# Patient Record
Sex: MALE | Race: WHITE | NOT HISPANIC OR LATINO | Employment: FULL TIME | URBAN - METROPOLITAN AREA
[De-identification: names, ages, dates, MRNs, and addresses within clinical notes are randomized per-mention and may not be internally consistent; named-entity substitution may affect disease eponyms.]

---

## 2018-05-21 ENCOUNTER — TRANSFERRED RECORDS (OUTPATIENT)
Dept: HEALTH INFORMATION MANAGEMENT | Facility: CLINIC | Age: 63
End: 2018-05-21

## 2019-03-10 ENCOUNTER — TRANSFERRED RECORDS (OUTPATIENT)
Dept: HEALTH INFORMATION MANAGEMENT | Facility: CLINIC | Age: 64
End: 2019-03-10

## 2019-03-11 LAB
EJECTION FRACTION: NORMAL %
INR PPP: 1.1

## 2019-03-12 LAB
CHOLEST SERPL-MCNC: 143 MG/DL (ref 100–199)
HDLC SERPL-MCNC: 44 MG/DL
LDLC SERPL CALC-MCNC: 88 MG/DL
NONHDLC SERPL-MCNC: 99 MG/DL
TRIGL SERPL-MCNC: 55 MG/DL

## 2019-03-13 LAB
ALT SERPL-CCNC: 32 IU/L (ref 8–45)
AST SERPL-CCNC: 48 IU/L (ref 2–40)
CREAT SERPL-MCNC: 1.29 MG/DL (ref 0.72–1.25)
EJECTION FRACTION: 63 %
GFR SERPL CREATININE-BSD FRML MDRD: 56 ML/MIN/1.73M2
GLUCOSE SERPL-MCNC: 91 MG/DL (ref 65–100)
POTASSIUM SERPL-SCNC: 4.4 MMOL/L (ref 3.5–5)

## 2019-03-14 LAB
CREAT SERPL-MCNC: 1.29 MG/DL (ref 0.72–1.25)
GFR SERPL CREATININE-BSD FRML MDRD: 56 ML/MIN/1.73M2

## 2019-04-16 ENCOUNTER — TRANSFERRED RECORDS (OUTPATIENT)
Dept: HEALTH INFORMATION MANAGEMENT | Facility: CLINIC | Age: 64
End: 2019-04-16

## 2019-05-30 ENCOUNTER — TRANSFERRED RECORDS (OUTPATIENT)
Dept: HEALTH INFORMATION MANAGEMENT | Facility: CLINIC | Age: 64
End: 2019-05-30

## 2019-06-07 ENCOUNTER — TRANSFERRED RECORDS (OUTPATIENT)
Dept: HEALTH INFORMATION MANAGEMENT | Facility: CLINIC | Age: 64
End: 2019-06-07

## 2019-09-12 ENCOUNTER — TRANSFERRED RECORDS (OUTPATIENT)
Dept: HEALTH INFORMATION MANAGEMENT | Facility: CLINIC | Age: 64
End: 2019-09-12

## 2019-09-22 ENCOUNTER — TRANSFERRED RECORDS (OUTPATIENT)
Dept: HEALTH INFORMATION MANAGEMENT | Facility: CLINIC | Age: 64
End: 2019-09-22

## 2019-09-24 ENCOUNTER — TRANSFERRED RECORDS (OUTPATIENT)
Dept: HEALTH INFORMATION MANAGEMENT | Facility: CLINIC | Age: 64
End: 2019-09-24

## 2020-03-18 ENCOUNTER — TELEPHONE (OUTPATIENT)
Dept: FAMILY MEDICINE | Facility: CLINIC | Age: 65
End: 2020-03-18

## 2020-03-18 NOTE — TELEPHONE ENCOUNTER
Attempted to call patient. Busy signal. Will try again later.  Mercedes Jerez on 3/18/2020 at 10:02 AM

## 2020-03-24 ENCOUNTER — TRANSFERRED RECORDS (OUTPATIENT)
Dept: HEALTH INFORMATION MANAGEMENT | Facility: CLINIC | Age: 65
End: 2020-03-24

## 2020-06-17 ENCOUNTER — OFFICE VISIT (OUTPATIENT)
Dept: FAMILY MEDICINE | Facility: CLINIC | Age: 65
End: 2020-06-17
Payer: COMMERCIAL

## 2020-06-17 VITALS
WEIGHT: 153.13 LBS | BODY MASS INDEX: 24.03 KG/M2 | TEMPERATURE: 98.1 F | HEART RATE: 65 BPM | SYSTOLIC BLOOD PRESSURE: 152 MMHG | RESPIRATION RATE: 12 BRPM | OXYGEN SATURATION: 100 % | DIASTOLIC BLOOD PRESSURE: 80 MMHG | HEIGHT: 67 IN

## 2020-06-17 DIAGNOSIS — R31.0 GROSS HEMATURIA: Primary | ICD-10-CM

## 2020-06-17 DIAGNOSIS — I10 ESSENTIAL HYPERTENSION: ICD-10-CM

## 2020-06-17 DIAGNOSIS — I48.0 PAF (PAROXYSMAL ATRIAL FIBRILLATION) (H): ICD-10-CM

## 2020-06-17 DIAGNOSIS — R93.41 ABNORMAL CT SCAN, BLADDER: ICD-10-CM

## 2020-06-17 PROBLEM — Z86.79 S/P ABLATION OF ATRIAL FIBRILLATION: Status: ACTIVE | Noted: 2019-09-12

## 2020-06-17 PROBLEM — Z98.890 S/P ABLATION OF ATRIAL FIBRILLATION: Status: ACTIVE | Noted: 2019-09-12

## 2020-06-17 LAB
ALBUMIN SERPL-MCNC: 3.8 G/DL (ref 3.4–5)
ALBUMIN UR-MCNC: 30 MG/DL
ALP SERPL-CCNC: 104 U/L (ref 40–150)
ALT SERPL W P-5'-P-CCNC: 30 U/L (ref 0–70)
ANION GAP SERPL CALCULATED.3IONS-SCNC: 4 MMOL/L (ref 3–14)
APPEARANCE UR: ABNORMAL
AST SERPL W P-5'-P-CCNC: 20 U/L (ref 0–45)
BILIRUB SERPL-MCNC: 0.5 MG/DL (ref 0.2–1.3)
BILIRUB UR QL STRIP: NEGATIVE
BUN SERPL-MCNC: 24 MG/DL (ref 7–30)
CALCIUM SERPL-MCNC: 8.6 MG/DL (ref 8.5–10.1)
CHLORIDE SERPL-SCNC: 112 MMOL/L (ref 94–109)
CHOLEST SERPL-MCNC: 160 MG/DL
CO2 SERPL-SCNC: 26 MMOL/L (ref 20–32)
COLOR UR AUTO: YELLOW
CREAT SERPL-MCNC: 1.13 MG/DL (ref 0.66–1.25)
ERYTHROCYTE [DISTWIDTH] IN BLOOD BY AUTOMATED COUNT: 12 % (ref 10–15)
GFR SERPL CREATININE-BSD FRML MDRD: 67 ML/MIN/{1.73_M2}
GLUCOSE SERPL-MCNC: 98 MG/DL (ref 70–99)
GLUCOSE UR STRIP-MCNC: NEGATIVE MG/DL
HCT VFR BLD AUTO: 38.9 % (ref 40–53)
HDLC SERPL-MCNC: 48 MG/DL
HGB BLD-MCNC: 12.7 G/DL (ref 13.3–17.7)
HGB UR QL STRIP: ABNORMAL
KETONES UR STRIP-MCNC: NEGATIVE MG/DL
LDLC SERPL CALC-MCNC: 93 MG/DL
LEUKOCYTE ESTERASE UR QL STRIP: ABNORMAL
MCH RBC QN AUTO: 30.8 PG (ref 26.5–33)
MCHC RBC AUTO-ENTMCNC: 32.6 G/DL (ref 31.5–36.5)
MCV RBC AUTO: 94 FL (ref 78–100)
MUCOUS THREADS #/AREA URNS LPF: PRESENT /LPF
NITRATE UR QL: NEGATIVE
NONHDLC SERPL-MCNC: 112 MG/DL
PH UR STRIP: 5 PH (ref 5–7)
PLATELET # BLD AUTO: 204 10E9/L (ref 150–450)
POTASSIUM SERPL-SCNC: 4.5 MMOL/L (ref 3.4–5.3)
PROT SERPL-MCNC: 7.4 G/DL (ref 6.8–8.8)
RBC # BLD AUTO: 4.12 10E12/L (ref 4.4–5.9)
RBC #/AREA URNS AUTO: >182 /HPF (ref 0–2)
SODIUM SERPL-SCNC: 142 MMOL/L (ref 133–144)
SOURCE: ABNORMAL
SP GR UR STRIP: 1.01 (ref 1–1.03)
SQUAMOUS #/AREA URNS AUTO: <1 /HPF (ref 0–1)
TRIGL SERPL-MCNC: 97 MG/DL
UROBILINOGEN UR STRIP-MCNC: 0 MG/DL (ref 0–2)
WBC # BLD AUTO: 5.5 10E9/L (ref 4–11)
WBC #/AREA URNS AUTO: 28 /HPF (ref 0–5)

## 2020-06-17 PROCEDURE — 81001 URINALYSIS AUTO W/SCOPE: CPT | Performed by: FAMILY MEDICINE

## 2020-06-17 PROCEDURE — 36415 COLL VENOUS BLD VENIPUNCTURE: CPT | Performed by: FAMILY MEDICINE

## 2020-06-17 PROCEDURE — 87086 URINE CULTURE/COLONY COUNT: CPT | Performed by: FAMILY MEDICINE

## 2020-06-17 PROCEDURE — 80061 LIPID PANEL: CPT | Performed by: FAMILY MEDICINE

## 2020-06-17 PROCEDURE — 85027 COMPLETE CBC AUTOMATED: CPT | Performed by: FAMILY MEDICINE

## 2020-06-17 PROCEDURE — 99204 OFFICE O/P NEW MOD 45 MIN: CPT | Performed by: FAMILY MEDICINE

## 2020-06-17 PROCEDURE — 80053 COMPREHEN METABOLIC PANEL: CPT | Performed by: FAMILY MEDICINE

## 2020-06-17 RX ORDER — LISINOPRIL 5 MG/1
5 TABLET ORAL DAILY
Qty: 30 TABLET | Refills: 1 | Status: SHIPPED | OUTPATIENT
Start: 2020-06-17 | End: 2020-06-17

## 2020-06-17 RX ORDER — ROPINIROLE 0.5 MG/1
TABLET, FILM COATED ORAL
COMMUNITY
Start: 2020-04-27 | End: 2023-11-01

## 2020-06-17 RX ORDER — AMLODIPINE BESYLATE 2.5 MG/1
2.5 TABLET ORAL DAILY
Qty: 30 TABLET | Refills: 0 | Status: ON HOLD | OUTPATIENT
Start: 2020-06-17 | End: 2020-07-29

## 2020-06-17 SDOH — HEALTH STABILITY: MENTAL HEALTH: HOW OFTEN DO YOU HAVE A DRINK CONTAINING ALCOHOL?: NEVER

## 2020-06-17 ASSESSMENT — PAIN SCALES - GENERAL: PAINLEVEL: NO PAIN (0)

## 2020-06-17 ASSESSMENT — MIFFLIN-ST. JEOR: SCORE: 1433.2

## 2020-06-17 NOTE — PROGRESS NOTES
Subjective     Marilou Saldivar is a 65 year old male who presents to clinic today for the following health issues:    HPI   Genitourinary - Male  Onset: December 2019    Description:   Dysuria (painful urination): no   Hematuria (blood in urine): YES  Frequency: no   Are you urinating at night : no   Hesitancy (delay in urine): no   Retention (unable to empty): no   Decrease in urinary flow: no   Incontinence: no     Progression of Symptoms:  same and intermittent    Accompanying Signs & Symptoms:  Fever: YES- possibly, he felt feverish at times  Back/Flank pain: no   Urethral discharge: no   Testicle lumps/masses/pain: no   Nausea and/or vomiting: no   Abdominal pain: no     History:   History of frequent UTI's: YES- many years ago  History of kidney stones: no   History of hernias: no   Personal or Family history of Prostate problems: no  Sexually active: YES    Precipitating factors:       Alleviating factors:      Marilou today for possible bladder infection.  Stated that he has been having on bloody urine in the last year and it has gotten worse slolwy.  Bright red blood with no associated dysuria, urine frequency or urgency.  Usually lasted for couple days when it happened.  No associated lower back pain.  No history of prostate problem or UTI.  He is born with only 1 kidney but no kidney problem that he knows of.  Never been a smoker and denied of excessive alcohol intake.  No history of pelvic or abdominal radiation therapy.  No N/V/D/C.  No problem with urination. No melena or hematochezia.  No history history of kidney stone.  Taking Xeralto for atrial fibrillation.  No fever or chills.  No other concerns.    Patient Active Problem List   Diagnosis     PAF (paroxysmal atrial fibrillation) (H)     HTN (hypertension)     S/P ablation of atrial fibrillation     Past Surgical History:   Procedure Laterality Date     EP ICD         Social History     Tobacco Use     Smoking status: Never Smoker     Smokeless  "tobacco: Never Used   Substance Use Topics     Alcohol use: Never     Frequency: Never     Family History   Problem Relation Age of Onset     Heart Disease Mother         heart bypass     Jc's disease Mother      Heart Disease Father      No Known Problems Brother      Unknown/Adopted Sister      No Known Problems Son      No Known Problems Daughter      No Known Problems Brother      Heart Disease Brother          of MI at 48         Current Outpatient Medications   Medication Sig Dispense Refill     amLODIPine (NORVASC) 2.5 MG tablet Take 1 tablet (2.5 mg) by mouth daily 30 tablet 0     Metoprolol Succinate 25 MG CS24 TAKE 1 TABLET BY MOUTH ONCE DAILY HOLD IF PULSE IS LESS THEN 60 BPM       rivaroxaban ANTICOAGULANT (XARELTO) 20 MG TABS tablet Take 20 mg by mouth       rOPINIRole (REQUIP) 0.5 MG tablet 0.5 mg       No Known Allergies  BP Readings from Last 3 Encounters:   20 (!) 152/80    Wt Readings from Last 3 Encounters:   20 69.5 kg (153 lb 2 oz)           Reviewed and updated as needed this visit by Provider         Review of Systems   Constitutional, HEENT, cardiovascular, pulmonary, gi and gu systems are negative, except as otherwise noted.      Objective    BP (!) 152/80   Pulse 65   Temp 98.1  F (36.7  C) (Tympanic)   Resp 12   Ht 1.702 m (5' 7\")   Wt 69.5 kg (153 lb 2 oz)   SpO2 100%   BMI 23.98 kg/m    Body mass index is 23.98 kg/m .  Physical Exam   GENERAL: healthy, alert and no distress.    RESP: lungs clear to auscultation - no rales, rhonchi or wheezes  CV: regular rate and rhythm, no murmur, no peripheral edema and peripheral pulses strong  ABDOMEN: soft, nontender, nondistended, no palpable masses or organomegaly with normal bowel sounds.  MS: no gross musculoskeletal defects noted, no edema.  No focal weakness.  NEURO: Normal strength and tone, mentation intact and speech normal.  No focal neurological deficit.  PSYCH: mentation appears normal, affect " normal/bright    Diagnostic Test Results:  Labs reviewed in Epic  Results for orders placed or performed in visit on 06/17/20   *UA reflex to Microscopic and Culture ([facility]; [facility]; [facility]; [facility]; [facility]; [place]; [facility])     Status: Abnormal    Specimen: Unspecified Urine   Result Value Ref [place]    Color Urine Yellow     Appearance Urine Cloudy     Glucose Urine Negative NEG^Negative mg/dL    Bilirubin Urine Negative NEG^Negative    Ketones Urine Negative NEG^Negative mg/dL    Specific Gravity Urine 1.015 1.003 - 1.035    Blood Urine Large (A) NEG^Negative    pH Urine 5.0 5.0 - 7.0 pH    Protein Albumin Urine 30 (A) NEG^Negative mg/dL    Urobilinogen mg/dL 0.0 0.0 - 2.0 mg/dL    Nitrite Urine Negative NEG^Negative    Leukocyte Esterase Urine Trace (A) NEG^Negative    Source Unspecified Urine     RBC Urine >182 (H) 0 - 2 /HPF    WBC Urine 28 (H) 0 - 5 /HPF    Squamous Epithelial /HPF Urine <1 0 - 1 /HPF    Mucous Urine Present (A) NEG^Negative /LPF   Comprehensive metabolic panel (BMP + Alb, Alk Phos, ALT, AST, Total. Bili, TP)     Status: Abnormal   Result Value Ref [place]    Sodium 142 133 - 144 mmol/L    Potassium 4.5 3.4 - 5.3 mmol/L    Chloride 112 (H) 94 - 109 mmol/L    Carbon Dioxide 26 20 - 32 mmol/L    Anion Gap 4 3 - 14 mmol/L    Glucose 98 70 - 99 mg/dL    Urea Nitrogen 24 7 - 30 mg/dL    Creatinine 1.13 0.66 - 1.25 mg/dL    GFR Estimate 67 >60 mL/min/[1.73_m2]    GFR Estimate If Black 78 >60 mL/min/[1.73_m2]    Calcium 8.6 8.5 - 10.1 mg/dL    Bilirubin Total 0.5 0.2 - 1.3 mg/dL    Albumin 3.8 3.4 - 5.0 g/dL    Protein Total 7.4 6.8 - 8.8 g/dL    Alkaline Phosphatase 104 40 - 150 U/L    ALT 30 0 - 70 U/L    AST 20 0 - 45 U/L   CBC with platelets     Status: Abnormal   Result Value Ref [place]    WBC 5.5 4.0 - 11.0 10e9/L    RBC Count 4.12 (L) 4.4 - 5.9 10e12/L    Hemoglobin 12.7 (L) 13.3 - 17.7 g/dL    Hematocrit 38.9 (L) 40.0 - 53.0 %    MCV 94 78 - 100  fl    MCH 30.8 26.5 - 33.0 pg    MCHC 32.6 31.5 - 36.5 g/dL    RDW 12.0 10.0 - 15.0 %    Platelet Count 204 150 - 450 10e9/L   Lipid panel reflex to direct LDL Fasting     Status: None   Result Value Ref [place]    Cholesterol 160 <200 mg/dL    Triglycerides 97 <150 mg/dL    HDL Cholesterol 48 >39 mg/dL    LDL Cholesterol Calculated 93 <100 mg/dL    Non HDL Cholesterol 112 <130 mg/dL   Urine Culture Aerobic Bacterial     Status: None    Specimen: Unspecified Urine   Result Value Ref [place]    Specimen Description Unspecified Urine     Special Requests Specimen received in preservative     Culture Micro No growth            Assessment & Plan     1. Gross hematuria  Stated that he has had the hematuria on and off for over a year; has never been evaluated.  Urine test today so hematuria - less likely infection. The urine culture was negative for infection.  He was sent for CT scan which showed enhancing intraluminal filling defect in the posterior right superior urine bladder that could be neoplasm and further evaluation with cystoscopy was recommended.  Will refer him to urology for further evaluation.  Labs as ordered, reviewed; CBC and CMP were normal.    - *UA reflex to Microscopic and Culture ([place]; [facility]; [facility]; [facility]; [facility]; [facility]; [place]; [place])  - Comprehensive metabolic panel (BMP + Alb, Alk Phos, ALT, AST, Total. Bili, TP)  - CBC with platelets  - Urine Culture Aerobic Bacterial  - CT Urogram wo & w Contrast; Future    2. Essential hypertension  Blood pressure is high today despite of taking the metoprolol 25 mg daily.  Pulses is on the lower side, I am hesitated to increase the Metoprolol dose.  Discussed with him about the nature of the condition and the goal for his blood pressure.  He was born with 1 kidney but no known of kidney disease.  Will continue with the metoprolol.  Added Norvasc 2.5 mg daily.  Recommend avoid high caffeine/salt intake.  Continue  his normal activity as tolerated.  Return to the clinic in 7-10 days for blood pressure check.    - Comprehensive metabolic panel (BMP + Alb, Alk Phos, ALT, AST, Total. Bili, TP)  - Lipid panel reflex to direct LDL Fasting  - amLODIPine (NORVASC) 2.5 MG tablet; Take 1 tablet (2.5 mg) by mouth daily  Dispense: 30 tablet; Refill: 0    3. PAF (paroxysmal atrial fibrillation) (H)  Stable.  Rate is controlled.  New with the Xarelto.    He was strongly recommended to follow-up with his primary care provider for physical.  Discussed about colorectal cancer screening but he declined.       Return in about 3 months (around 9/17/2020) for Physical Exam.    Olivia Goodwin Mai, MD  [facility]

## 2020-06-18 ENCOUNTER — HOSPITAL ENCOUNTER (OUTPATIENT)
Dept: CT IMAGING | Facility: CLINIC | Age: 65
Discharge: HOME | End: 2020-06-18
Attending: FAMILY MEDICINE | Admitting: FAMILY MEDICINE
Payer: COMMERCIAL

## 2020-06-18 DIAGNOSIS — R31.0 GROSS HEMATURIA: ICD-10-CM

## 2020-06-18 PROCEDURE — 25000125 ZZHC RX 250: Performed by: RADIOLOGY

## 2020-06-18 PROCEDURE — 74178 CT ABD&PLV WO CNTR FLWD CNTR: CPT

## 2020-06-18 PROCEDURE — 25000128 H RX IP 250 OP 636: Performed by: RADIOLOGY

## 2020-06-18 RX ORDER — IOPAMIDOL 755 MG/ML
500 INJECTION, SOLUTION INTRAVASCULAR ONCE
Status: COMPLETED | OUTPATIENT
Start: 2020-06-18 | End: 2020-06-18

## 2020-06-18 RX ADMIN — SODIUM CHLORIDE 60 ML: 9 INJECTION, SOLUTION INTRAVENOUS at 15:31

## 2020-06-18 RX ADMIN — IOPAMIDOL 75 ML: 755 INJECTION, SOLUTION INTRAVENOUS at 15:31

## 2020-06-19 ENCOUNTER — TELEPHONE (OUTPATIENT)
Dept: FAMILY MEDICINE | Facility: CLINIC | Age: 65
End: 2020-06-19

## 2020-06-19 LAB
BACTERIA SPEC CULT: NO GROWTH
Lab: NORMAL
SPECIMEN SOURCE: NORMAL

## 2020-06-19 NOTE — TELEPHONE ENCOUNTER
----- Message from Olivia Goodwin Mai, MD sent at 6/19/2020 11:19 AM CDT -----  Please call with results. Please ensure that his urine culture showed no infection.      Olivia Villatoro MD.

## 2020-06-19 NOTE — TELEPHONE ENCOUNTER
Spoke with patient and informed of results below. Patient understood. No further questions or concerns for pcp as this time.     Wendy Boyd MA

## 2020-06-22 ENCOUNTER — TELEPHONE (OUTPATIENT)
Dept: FAMILY MEDICINE | Facility: CLINIC | Age: 65
End: 2020-06-22

## 2020-06-22 NOTE — TELEPHONE ENCOUNTER
I have attempted to contact this patient by phone with the following results: no answer.  Myra Garza MA     6/22/2020

## 2020-06-22 NOTE — TELEPHONE ENCOUNTER
----- Message from Olivia Goodwin Mai, MD sent at 6/22/2020  5:03 PM CDT -----  Please let patient know that the CT scan shows a concerning lesion in his bladder that need to be further evaluated.  He will be referred to urology for further evaluation and manage.  Please let me know if he has any concerns or question.

## 2020-06-23 NOTE — TELEPHONE ENCOUNTER
Dr. Villatoro placed the referral for Urology, please give patient the number to call  [phone].  I believe this is for the [facility] office.  Dr. Ta is not seeing patients in [place] at this time.  Thank you   April ELDER

## 2020-06-23 NOTE — TELEPHONE ENCOUNTER
Patient was given information of lab results (lesion on the bladder) and informed of the referral to urology. He was given the phone number to schedule an appointment to Dr. Nancy clark. He will call to schedule the appointment at his convenience.    Queta Valentine CMA

## 2020-06-26 ENCOUNTER — VIRTUAL VISIT (OUTPATIENT)
Dept: UROLOGY | Facility: CLINIC | Age: 65
End: 2020-06-26
Payer: COMMERCIAL

## 2020-06-26 DIAGNOSIS — R31.0 GROSS HEMATURIA: Primary | ICD-10-CM

## 2020-06-26 DIAGNOSIS — R93.41 ABNORMAL CT SCAN, BLADDER: ICD-10-CM

## 2020-06-26 PROCEDURE — 99203 OFFICE O/P NEW LOW 30 MIN: CPT | Mod: GT | Performed by: UROLOGY

## 2020-06-26 NOTE — PROGRESS NOTES
"Marilou Saldivar is a 65 year old male who is being evaluated via a billable video visit.      The patient has been notified of following:     \"This video visit will be conducted via a call between you and your physician/provider. We have found that certain health care needs can be provided without the need for an in-person physical exam.  This service lets us provide the care you need with a video conversation.  If a prescription is necessary we can send it directly to your pharmacy.  If lab work is needed we can place an order for that and you can then stop by our lab to have the test done at a later time.    Video visits are billed at different rates depending on your insurance coverage.  Please reach out to your insurance provider with any questions.    If during the course of the call the physician/provider feels a video visit is not appropriate, you will not be charged for this service.\"    Patient has given verbal consent for Video visit? Yes    How would you like to obtain your AVS? MyChart  Patient would like the video invitation sent by: Text to cell phone:      Will anyone else be joining your video visit? No        Video-Visit Details    Urology Note            S:  Marilou Saldivar is a 65 year old male who was seen in a consultation at the request of Dr. Villatoro for hematuria.   Patient has intermittent gross hematuria for many months.  He has no other voiding symptoms in addition to hematuria.  He has no flank pain.  He has no history of kidney stone.  He denies any trauma.  Recent UA showed many rbc/hpf.  Urine culture was neg.  Recent CT urogram showed large bladder filling defect.   He is no a smoker.  Past Medical History:   Diagnosis Date     Atrial fibrillation (H)      Current Outpatient Medications   Medication Sig Dispense Refill     amLODIPine (NORVASC) 2.5 MG tablet Take 1 tablet (2.5 mg) by mouth daily 30 tablet 0     Metoprolol Succinate 25 MG CS24 TAKE 1 TABLET BY MOUTH ONCE DAILY HOLD IF PULSE " IS LESS THEN 60 BPM       rivaroxaban ANTICOAGULANT (XARELTO) 20 MG TABS tablet Take 20 mg by mouth       rOPINIRole (REQUIP) 0.5 MG tablet 0.5 mg       Past Surgical History:   Procedure Laterality Date     EP ICD        Social History     Socioeconomic History     Marital status:      Spouse name: Not on file     Number of children: Not on file     Years of education: Not on file     Highest education level: Not on file   Occupational History     Not on file   Social Needs     Financial resource strain: Not on file     Food insecurity     Worry: Not on file     Inability: Not on file     Transportation needs     Medical: Not on file     Non-medical: Not on file   Tobacco Use     Smoking status: Never Smoker     Smokeless tobacco: Never Used   Substance and Sexual Activity     Alcohol use: Never     Frequency: Never     Drug use: Never     Sexual activity: Yes   Lifestyle     Physical activity     Days per week: Not on file     Minutes per session: Not on file     Stress: Not on file   Relationships     Social connections     Talks on phone: Not on file     Gets together: Not on file     Attends Spiritism service: Not on file     Active member of club or organization: Not on file     Attends meetings of clubs or organizations: Not on file     Relationship status: Not on file     Intimate partner violence     Fear of current or ex partner: Not on file     Emotionally abused: Not on file     Physically abused: Not on file     Forced sexual activity: Not on file   Other Topics Concern     Not on file   Social History Narrative     Not on file     Family History   Problem Relation Age of Onset     Heart Disease Mother         heart bypass     Hughes's disease Mother      Heart Disease Father      No Known Problems Brother      Unknown/Adopted Sister      No Known Problems Son      No Known Problems Daughter      No Known Problems Brother      Heart Disease Brother          of MI at 48          REVIEW OF  SYSTEMS  =================  C: NEGATIVE for fever, chills, change in weight  I: NEGATIVE for worrisome rashes, moles or lesions  E/M: NEGATIVE for ear, mouth and throat problems  R: NEGATIVE for significant cough or SHORTNESS OF BREATH  CV:  NEGATIVE for chest pain, palpitations or peripheral edema  GI: NEGATIVE for nausea, abdominal pain, heartburn, or change in bowel habits  NEURO: NEGATIVE numbness/weakness  : see HPI  PSYCH: NEGATIVE depression/anxiety  LYmph: no new enlarged lymph nodes  Ortho: no new trauma/movements           GENERAL: Healthy, alert and no distress  EYES: Eyes grossly normal to inspection.  No discharge or erythema, or obvious scleral/conjunctival abnormalities.  RESP: No audible wheeze, cough, or visible cyanosis.  No visible retractions or increased work of breathing.    SKIN: Visible skin clear. No significant rash, abnormal pigmentation or lesions.  NEURO: Cranial nerves grossly intact.  Mentation and speech appropriate for age.  PSYCH: Mentation appears normal, affect normal/bright, judgement and insight intact, normal speech and appearance well-groomed.    Assessment:  Hematuria. Gross                           Abnormal bladder on CT scan    Recommendation: Schedule patient for cystoscopy soon.    Type of service:  Video Visit    Video Start Time: 945  Video End Time: 1000    Originating Location (pt. Location): Home    Distant Location (provider location):  [facility]     Platform used for Video Visit: Chris Ta MD

## 2020-07-01 ENCOUNTER — TELEPHONE (OUTPATIENT)
Dept: FAMILY MEDICINE | Facility: CLINIC | Age: 65
End: 2020-07-01

## 2020-07-01 NOTE — TELEPHONE ENCOUNTER
Called pt to reschedule visit he would like to cancel at this time and call later to reschedule it. Nishi Harper, ROYCEA

## 2020-07-03 ENCOUNTER — PREP FOR PROCEDURE (OUTPATIENT)
Dept: SURGERY | Facility: CLINIC | Age: 65
End: 2020-07-03

## 2020-07-03 ENCOUNTER — OFFICE VISIT (OUTPATIENT)
Dept: UROLOGY | Facility: CLINIC | Age: 65
End: 2020-07-03
Payer: COMMERCIAL

## 2020-07-03 DIAGNOSIS — R93.41 ABNORMAL CT SCAN, BLADDER: ICD-10-CM

## 2020-07-03 DIAGNOSIS — C67.4 MALIGNANT NEOPLASM OF POSTERIOR WALL OF URINARY BLADDER (H): Primary | ICD-10-CM

## 2020-07-03 DIAGNOSIS — C67.4 MALIGNANT NEOPLASM OF POSTERIOR WALL OF URINARY BLADDER (H): ICD-10-CM

## 2020-07-03 DIAGNOSIS — R31.0 GROSS HEMATURIA: Primary | ICD-10-CM

## 2020-07-03 PROCEDURE — 88112 CYTOPATH CELL ENHANCE TECH: CPT | Performed by: UROLOGY

## 2020-07-03 PROCEDURE — 99212 OFFICE O/P EST SF 10 MIN: CPT | Mod: 25 | Performed by: UROLOGY

## 2020-07-03 PROCEDURE — 52000 CYSTOURETHROSCOPY: CPT | Performed by: UROLOGY

## 2020-07-03 RX ORDER — CIPROFLOXACIN 500 MG/1
500 TABLET, FILM COATED ORAL ONCE
Status: DISCONTINUED | OUTPATIENT
Start: 2020-07-03 | End: 2020-07-09

## 2020-07-03 NOTE — PROGRESS NOTES
The following medication was given:     MEDICATION:  Ciprofloxin  ROUTE: PO  SITE: mouth  DOSE: 500  LOT #: [id number]  : [employer]  EXPIRATION DATE: 6/2021  NDC#: 05763-247-91   Was there drug waste? No

## 2020-07-03 NOTE — PATIENT INSTRUCTIONS
Surgery Preparation    You will receive a phone call from the [facility] within 1-2 days. If you do not receive a call within 2 days, contact [phone] to schedule the procedure. You can write the location/date/time of surgery in the space provided below for your records.    Location of Surgery:                                          Date of Surgery:                                                 Time of Arrival:                                                   Time of Surgery:                                                   Please arrange an appointment with a primary care provider for a pre-op physical. This is a mandatory appointment that needs to be completed within the two weeks prior to your surgery date. This gives clearance for you to receive anesthesia during your procedure. If you have had a pre-op physical within 30 days of your surgery date, you may not need another one. Check with your provider.    If you are having a Same Day Surgery, you must have a  to and from the procedure. Someone needs to stay with you post-operatively for 12 hours.     Do not eat or drink any solids, milk products, or pulpy juices after midnight the night before your surgery. You may have clear liquids up to 8 hours prior to the surgery. This would include water, soda, coffee (without cream), tea, broth, and jello.    Please stop all over the counter blood thinners such as Aspirin, Advil, Aleve, Ibuprofen, Motrin, and Excedrin one week prior to surgery. Please discontinue prescription blood thinners 5-7 days prior to surgery, per your primary physician's orders.     Please check with your insurance company to confirm that your procedure is covered, and that the facility is in-network.     Call the Urology Department @ [phone] if you have questions about your surgery, or if you need to reschedule your procedure.     Patient Education     Transurethral Resection of Bladder    This procedure may  be called a transurethral resection of bladder tumor (TURBT) or transurethral resection (TUR). Small pieces of tissue, called samples, are taken from inside of your bladder. This is called a bladder biopsy. The samples are then tested in a lab. This procedure  is done to help find the cause of a bladder problem, such as bladder cancer. If the tumors are very small and haven't spread deep into the bladder wall, TURBT may be the only treatment.  Preparing for the procedure  Follow the directions you were given to prepare for the procedure. Also:    Tell your healthcare provider about all medicines you take. This includes any over-the-counter medicines, herbs, vitamins, and other supplements. Be sure to include any blood thinners, such as warfarin, clopidogrel, or daily aspirin. You may need to stop taking some or all of your medicines before surgery.    Your healthcare providers may tell you not to eat or drink during the hours before your procedure. (If you have been instructed to take medicines, take them with a small sip of water.)  The day of the procedure  The procedure takes about minutes. You ll likely go home the same day.  Before the procedure begins  What to expect before the procedure:    A small tube called an IV (intravenous) is put into a vein in your arm or hand. This tube is used to give you fluids and medicines (such as antibiotics).    To keep you free of pain during the procedure, you might be given medicine called anesthesia. You may be given general anesthesia. This medicine puts you into a deep sleep. when you get general anesthesia, you will need to have a breathing tube put in and be placed on a breathing machine. Or you may be given spinal anesthesia. This medicine numbs your body only from the waist down. In some cases, local anesthesia is used. This medicine numbs only the area being treated.  During the procedure  What to expect during the procedure:    A special tool called a cystoscope  (scope) is used. This is a thin, lighted tube with a tiny lens on the end to see inside the bladder. The healthcare provider puts it into your bladder through the urethra. The urethra is a thin tube in your body that urine passes through. It connects the bladder to the outside of your body.    Water is sent through the scope to fill the bladder. This stretches the bladder to give the healthcare provider a better view of the inside lining.    A long, thin surgical tool is passed through the scope into the bladder. It's used to remove small samples of tissue from the bladder lining and the muscle wall right under it. An electric tool may be used to stop any bleeding.    When the procedure is complete, all tools are removed and the bladder is drained.    A thin tube (Gongora catheter) may be put in your bladder to drain urine while the bladder heals.  After the procedure  You ll be taken to a room to rest until the anesthesia wears off. If a breathing tube was used, your throat might be sore at first. You might be given medicines to manage pain and prevent infection. After a few hours, you ll be able to go home. Have an adult family member or friend ready to drive you.  Recovering at home  Once you re home, be as active as you comfortably can. Get up and walk around, but avoid exercise or heavy activities until you feel better. You can likely return to your normal routine in days. If you go home with a catheter, care for it as directed. And follow any special instructions your healthcare provider gave you.  When to call your healthcare provider  Call your healthcare provider right away if you have any of the following:    Chest pain or trouble breathing (call )    A fever of   (38 C) or higher, or as directed by your healthcare provider    Pain that s not controlled with medicine    Trouble urinating or being unable to urinate    Bloody urine for more than hours  Follow-up  You ll have a follow-up visit with your  healthcare provider in about days. During this visit, your healthcare provider will discuss the results of your biopsy. You and your healthcare provider will also discuss any treatments that might be needed.  Risks and possible complications  Risks of this procedure include:    Pain or burning when urinating for a day or so after the procedure (this is normal)    Bleeding or blood in the urine    Infection    Damage to the bladder wall (may require temporary catheter drainage or further repair)    Narrowing of the urethra    Risks of anesthesia. You will discuss these with the anesthesiologist.  Talk with your healthcare provider about what you can expect this procedure to be like. Be sure you understand what problems you should watch for and know how to reach the healthcare provider after hours and on weekends.  Date Last Reviewed:     [id number] [employer]. [address] [place] [address], [place] [address]. All rights reserved. This information is not intended as a substitute for professional medical care. Always follow your healthcare professional's instructions.

## 2020-07-03 NOTE — PROGRESS NOTES
S: Marilou Saldivar is a 65 year old male returns for hematuria.    Patient is draped and prepped.  Flexible cystoscopy placed under direct vision.      The anterior urethra is normal   The prostatic urethra showed bilateral lobe enlargement.     The length is 2cm,  the coaptation is 2 cm.     In the bladder there is a tumor 2 cm.    Assessment/Plan:  (R31.0) Gross hematuria  (primary encounter diagnosis)  Comment:    Plan: CYSTOURETHROSCOPY (86522), ciprofloxacin         (CIPRO) tablet 500 mg, Cytology non gyn         Bladder cancer found    (R93.41) Abnormal CT scan, bladder  Comment:    Plan: CYSTOURETHROSCOPY (69386), ciprofloxacin         (CIPRO) tablet 500 mg, Cytology non gyn             (C67.4) Malignant neoplasm of posterior wall of urinary bladder (H)  Comment:    Plan: schedule for turbt / mitomycin instillation           Risks and benefits discussed           Catheter post op discussed            Risk of bladder perforation discussed.

## 2020-07-07 LAB — COPATH REPORT: NORMAL

## 2020-07-09 ENCOUNTER — TELEPHONE (OUTPATIENT)
Dept: UROLOGY | Facility: CLINIC | Age: 65
End: 2020-07-09

## 2020-07-09 DIAGNOSIS — Z11.59 ENCOUNTER FOR SCREENING FOR OTHER VIRAL DISEASES: Primary | ICD-10-CM

## 2020-07-09 PROBLEM — C67.4 MALIGNANT NEOPLASM OF POSTERIOR WALL OF URINARY BLADDER (H): Status: ACTIVE | Noted: 2020-07-09

## 2020-07-09 NOTE — TELEPHONE ENCOUNTER
Type of surgery: cystoscopy with transurethral resection of bladder tumor and instillation of chemotherapeutic agent into bladder  Location of surgery: [facility]   Date of surgery: 7/16/20  Surgeon: Dr. Ta  Pre-Op Appt Date: message sent to provider  Post-Op Appt Date: 7/30/20   Packet sent out: Surgery packet mailed to patient's home address.   Pre-cert/Authorization completed: NA  Date: 7/9/2020    Alem Duque  Surgery Scheduler

## 2020-07-10 NOTE — TELEPHONE ENCOUNTER
Please ask him if he can or will work her in. There is no openings next week.  Stephania Baig MA

## 2020-07-10 NOTE — TELEPHONE ENCOUNTER
Can he be worked in with someone early next week?  Please call patient with date/time if possible, or let me know!    Thanks much!

## 2020-07-10 NOTE — TELEPHONE ENCOUNTER
Attempted to reach patient, unable to leave message. Will have team try again later. Please help patient schedule pre-op.     Wendy Boyd MA

## 2020-07-13 NOTE — TELEPHONE ENCOUNTER
Spoke to patients spouse after not begin able to reach Marilou and notified of surgery date change to 7/23 due to conflict in surgeon schedule. Post Op changed.

## 2020-07-14 ENCOUNTER — OFFICE VISIT (OUTPATIENT)
Dept: FAMILY MEDICINE | Facility: CLINIC | Age: 65
End: 2020-07-14
Payer: COMMERCIAL

## 2020-07-14 VITALS
RESPIRATION RATE: 14 BRPM | HEART RATE: 74 BPM | TEMPERATURE: 98 F | BODY MASS INDEX: 23.84 KG/M2 | SYSTOLIC BLOOD PRESSURE: 118 MMHG | OXYGEN SATURATION: 100 % | DIASTOLIC BLOOD PRESSURE: 76 MMHG | WEIGHT: 152.2 LBS

## 2020-07-14 DIAGNOSIS — Z12.11 COLON CANCER SCREENING: ICD-10-CM

## 2020-07-14 DIAGNOSIS — I10 ESSENTIAL HYPERTENSION: ICD-10-CM

## 2020-07-14 DIAGNOSIS — I48.0 PAF (PAROXYSMAL ATRIAL FIBRILLATION) (H): ICD-10-CM

## 2020-07-14 DIAGNOSIS — Z01.818 PREOP GENERAL PHYSICAL EXAM: Primary | ICD-10-CM

## 2020-07-14 DIAGNOSIS — C67.4 MALIGNANT NEOPLASM OF POSTERIOR WALL OF URINARY BLADDER (H): ICD-10-CM

## 2020-07-14 DIAGNOSIS — Z98.890 S/P ABLATION OF ATRIAL FIBRILLATION: ICD-10-CM

## 2020-07-14 DIAGNOSIS — Z79.01 CHRONIC ANTICOAGULATION: ICD-10-CM

## 2020-07-14 DIAGNOSIS — Z86.79 S/P ABLATION OF ATRIAL FIBRILLATION: ICD-10-CM

## 2020-07-14 PROCEDURE — 99215 OFFICE O/P EST HI 40 MIN: CPT | Performed by: FAMILY MEDICINE

## 2020-07-14 PROCEDURE — 93000 ELECTROCARDIOGRAM COMPLETE: CPT | Performed by: FAMILY MEDICINE

## 2020-07-14 ASSESSMENT — PAIN SCALES - GENERAL: PAINLEVEL: NO PAIN (0)

## 2020-07-14 NOTE — PROGRESS NOTES
[place] [id number] [facility] [address]  [phone]  Dept: [phone]    PRE-OP EVALUATION:  Today's date: 2020    Marilou Saldivar (: 1955) presents for pre-operative evaluation assessment as requested by Dr. Ta.  He requires evaluation and anesthesia risk assessment prior to undergoing surgery/procedure for treatment of CYSTOSCOPY, WITH TRANSURETHRAL RESECTION OF BLADDER TUMOR AND INSTILLATION OF CHEMOTHERAPEUTIC AGENT INTO BLADDER  .    Proposed Surgery/ Procedure: CYSTOSCOPY, WITH TRANSURETHRAL RESECTION OF BLADDER TUMOR AND INSTILLATION OF CHEMOTHERAPEUTIC AGENT INTO BLADDER   Date of Surgery/ Procedure: 2020  Time of Surgery/ Procedure: 9 am  Hospital/Surgical Facility: [facility]    Primary Physician: Krishan Hernández [place]  Type of Anesthesia Anticipated: to be determined    Patient has a Health Care Directive or Living Will:  NO    1. NO - Do you have a history of heart attack, stroke, stent, bypass or surgery on an artery in the head, neck, heart or legs?  2. NO - Do you ever have any pain or discomfort in your chest?  3. NO - Do you have a history of  Heart Failure?  4. NO - Are you troubled by shortness of breath when: walking on the level, up a slight hill or at night?  5. NO - Do you currently have a cold, bronchitis or other respiratory infection?  6. NO - Do you have a cough, shortness of breath or wheezing?  7. NO - Do you sometimes get pains in the calves of your legs when you walk?  8. NO - Do you or anyone in your family have previous history of blood clots?  9. NO - Do you or does anyone in your family have a serious bleeding problem such as prolonged bleeding following surgeries or cuts?  10. YES - HAVE YOU EVER HAD PROBLEMS WITH ANEMIA OR BEEN TOLD TO TAKE IRON PILLS? Last month he was told there was a possibility of being anemic   11. NO - Have you had any abnormal blood loss such as black, tarry or bloody stools, or abnormal  vaginal bleeding?  12. NO - Have you ever had a blood transfusion?  13. NO - Have you or any of your relatives ever had problems with anesthesia?  14. NO - Do you have sleep apnea, excessive snoring or daytime drowsiness?  15. NO - Do you have any prosthetic heart valves?  16. NO - Do you have prosthetic joints?  17. NO - Is there any chance that you may be pregnant?      HPI:     HPI related to upcoming procedure:     Marilou is here today for pre-op physical. He is scheduled for cystoscopy with transurethral resection of the bladder tumor and instillation of chemotherapeutic agent into the bladder on 7/23/2020 with Dr. Ta at [facility].  Been having g hematuria on and off in the last year and recent work-up suggested of bladder malignancy and therefore recommended for cystoscopy.  It is expected to be the same day procedure with MAC/general anesthesia. No personal or family history of anesthesia complication or pre-matured CAD or MI.  Has not been on steroid orally in the last 6 months.  Not taking Aspirin or NSAID but is taking the Xeralto daily for A.Fib.  No history of blood clots, stroke or MI.      Generally is healthy.   His active medical problem and medication lists reviewed and updated.  He has a history of paroxysmal atrial fibrillation with s/p ablation which has been working well.  He is also on metoprolol.  No heart palpitation or irregular heartbeat.  No chest pain or shortness of breath.  Been taking medication as prescribed with no side effect.  His blood pressure has been good stable and normal.    He generally is doing well and has no concern today. No headache or dizziness. No URI symptoms include running nose, nasal congestion, ST, coughing, fever or chill.  No chest pain or SOB.  No N/V/D/C and denied of having problem with urination.  He never smokes and denied of having breathing problem.  No history of asthma or COPD.      MEDICAL HISTORY:     Patient Active Problem List     Diagnosis Date Noted     Malignant neoplasm of posterior wall of urinary bladder (H) 07/09/2020     Priority: Medium     Added automatically from request for surgery [id number]       HTN (hypertension) 09/12/2019     Priority: Medium     S/P ablation of atrial fibrillation 09/12/2019     Priority: Medium     PAF (paroxysmal atrial fibrillation) (H) 05/16/2019     Priority: Medium      Past Medical History:   Diagnosis Date     Atrial fibrillation (H)      Past Surgical History:   Procedure Laterality Date     EP ICD       Current Outpatient Medications   Medication Sig Dispense Refill     amLODIPine (NORVASC) 2.5 MG tablet Take 1 tablet (2.5 mg) by mouth daily 30 tablet 0     Metoprolol Succinate 25 MG CS24 TAKE 1 TABLET BY MOUTH ONCE DAILY HOLD IF PULSE IS LESS THEN 60 BPM       rivaroxaban ANTICOAGULANT (XARELTO) 20 MG TABS tablet Take 20 mg by mouth       rOPINIRole (REQUIP) 0.5 MG tablet 0.5 mg       OTC products: None, except as noted above    No Known Allergies   Latex Allergy: NO    Social History     Tobacco Use     Smoking status: Never Smoker     Smokeless tobacco: Never Used   Substance Use Topics     Alcohol use: Never     Frequency: Never     History   Drug Use Unknown       REVIEW OF SYSTEMS:   Constitutional, neuro, ENT, endocrine, pulmonary, cardiac, gastrointestinal, genitourinary, musculoskeletal, integument and psychiatric systems are negative, except as otherwise noted.    EXAM:   /76   Pulse 74   Temp 98  F (36.7  C) (Temporal)   Resp 14   Wt 69 kg (152 lb 3.2 oz)   SpO2 100%   BMI 23.84 kg/m        GENERAL APPEARANCE: healthy, alert and no distress     EYES: EOMI,- PERRL     HENT: ear canals and TM's normal. Nares are non-congested. Oropharynx is pink and moist, no ulcers or lesions. No tender with palpation to the sinuses.     NECK: no adenopathy, no asymmetry.  No tender with palpation to the cervical spine and its para-spinous muscle bilaterally.     RESP: lungs clear to  auscultation - no rales, rhonchi or wheezes     CV: regular rates and rhythm and no murmur.     ABDOMEN:  soft, nontender, nondistended with no palpable masses and bowel sounds normal     MS: extremities normal- no gross deformities noted.  No edema.  All 4 extremities are equally in strength.     NEURO: Normal strength and tone,  mentation intact and speech normal.  No focal neurological deficit     PSYCH: mentation appears normal. and affect normal/bright           DIAGNOSTICS:   EKG: Normal Sinus Rhythm, normal axis, normal intervals, no acute ST/T changes c/w ischemia, no LVH by voltage criteria, no ST depression or elevation.    Recent Labs   Lab Test 06/17/20  1618 03/14/19 03/13/19 03/11/19   HGB 12.7*  --   --   --   --      --   --   --   --    INR  --   --   --   --  1.1     --   --   --   --    POTASSIUM 4.5  --  4.4  --   --    CR 1.13 1.29* 1.29*   < >  --     < > = values in this interval not displayed.        IMPRESSION:   Reason for surgery/procedure: Bladder malignancy  Diagnosis/reason for consult: pre-op physical to evaluate for anesthesia and its henrietta-operative risks.    The proposed surgical procedure is considered INTERMEDIATE risk.    REVISED CARDIAC RISK INDEX  The patient has the following serious cardiovascular risks for perioperative complications such as (MI, PE, VFib and 3  AV Block):  No serious cardiac risks  INTERPRETATION: 1 risks: Class II (low risk - 0.9% complication rate) - due to comobidity    The patient has the following additional risks for perioperative complications:  Chronic anticoagulant with Xeralto      ICD-10-CM    1. Preop general physical exam  Z01.818 Fecal colorectal cancer screen (FIT)     EKG 12-lead complete w/read - Clinics   2. Malignant neoplasm of posterior wall of urinary bladder (H)  C67.4    3. S/P ablation of atrial fibrillation  Z98.890     Z86.79    4. PAF (paroxysmal atrial fibrillation) (H)  I48.0    5. Essential hypertension  I10    6.  Colon cancer screening  Z12.11    7. Chronic anticoagulation  Z79.01      1.  BP stable and normal    2.  A. Fib - s/p ablation.  EKG showed sinus rhythm with normal HR.  No history of MI, stroke or PE.  [id number] score of 2 today.  No smoking.  Discussed about the potential risks associated with holding off on the anticoagulant for the procedure - 5 days.  Benefit is outweighed the risk of holding it off - high risk for bleeding with bladder tumor biopsy/removal if staying on the anticoagulant.   He agreed to take the risk.  Instructed to resume it after the procedure if ok with the surgeon.    3.  Colon cancer screening - Discussed with him about options for colon cancer screening which include colonoscopy, Cologuard or FIT test.  Pros and cons of each option discussed.  He preferred FIT test.  He understands that positive FIT test will strongly recommended for further evaluation with colonoscopy.  He also informed that FIT is to be done annually.       RECOMMENDATIONS:     --No hx of DVT or PE history, DVT/PE per surgeon's recommendation/desire.  He is on Xarelto chronically, which is being held for 5-day before the procedure.  Recommended to restart the Xarelto at the procedure once it is felt safe by the surgeon.    Cardiovascular Risk  No histories of CAD/MI.  EKG showed sinus rhythm and rate.  Blood pressure has been stable/normal.  No further cardiovascular evaluation is needed for this procedure.      Pulmonary Risk  No pulmonary risk identified.  Do not smoke and has no history of COPD/asthma.      Obstructive Sleep Apnea (or suspected sleep apnea)  No history of sleep apnea.  No risk for sleep apnea identified.      Anemia  He has a history of anemia and was on iron supplement.  No history of blood transfusion.  He is okay to be transfused if necessary.      --Patient is to take all scheduled medications on the day of surgery EXCEPT for modifications listed below.  Please take your medications as prescribed  except.  No aspirin or NSAID (Ibuprofen, Aleve, Motrin, Naproxen, ect.) due to being on blood thinner  Hold the Xeralto for 5 days before procedure, resume it after the procedure  Hold your other medication except the Metoprolol on am of procedure. Take the metoprolol with a small sips of water on am procedure. Resume other meds after the procedure  Nothing to eat or drink except taking Metoprololo with small sip of water for 8 hrs before the procedure.      APPROVAL GIVEN to proceed with proposed procedure, without further diagnostic evaluation       Signed Electronically by: Olivia Goodwin Mai, MD    Copy of this evaluation report is provided to requesting physician.    [place] Preop Guidelines    Revised Cardiac Risk Index

## 2020-07-14 NOTE — PATIENT INSTRUCTIONS
Before Your Surgery      Call your surgeon if there is any change in your health. This includes signs of a cold or flu (such as a sore throat, runny nose, cough, rash or fever).    Do not smoke, drink alcohol or take over the counter medicine (unless your surgeon or primary care doctor tells you to) for the 24 hours before and after surgery.    If you take prescribed drugs: Follow your doctor s orders about which medicines to take and which to stop until after surgery.    Eating and drinking prior to surgery: follow the instructions from your surgeon    Take a shower or bath the night before surgery. Use the soap your surgeon gave you to gently clean your skin. If you do not have soap from your surgeon, use your regular soap. Do not shave or scrub the surgery site.  Wear clean pajamas and have clean sheets on your bed.       Please take your medications as prescribed except.  No aspirin or NSAID (Ibuprofen, Aleve, Motrin, Naproxen, ect.) due to being on blood thinner  Hold the Xeralto for 5 days before procedure, resume it after the procedure  Hold your other medication except the Metoprolol on am of procedure. Take the metoprolol with a small sips of water on am procedure. Resume other meds after the procedure  Nothing to eat or drink except taking Metoprololo with small sip of water for 8 hrs before the procedure.

## 2020-07-21 DIAGNOSIS — Z11.59 ENCOUNTER FOR SCREENING FOR OTHER VIRAL DISEASES: ICD-10-CM

## 2020-07-21 PROCEDURE — U0003 INFECTIOUS AGENT DETECTION BY NUCLEIC ACID (DNA OR RNA); SEVERE ACUTE RESPIRATORY SYNDROME CORONAVIRUS 2 (SARS-COV-2) (CORONAVIRUS DISEASE [COVID-19]), AMPLIFIED PROBE TECHNIQUE, MAKING USE OF HIGH THROUGHPUT TECHNOLOGIES AS DESCRIBED BY CMS-2020-01-R: HCPCS | Performed by: UROLOGY

## 2020-07-22 LAB
SARS-COV-2 RNA SPEC QL NAA+PROBE: NOT DETECTED
SPECIMEN SOURCE: NORMAL

## 2020-07-23 ENCOUNTER — ANESTHESIA (OUTPATIENT)
Dept: SURGERY | Facility: CLINIC | Age: 65
End: 2020-07-23
Payer: COMMERCIAL

## 2020-07-23 ENCOUNTER — ANESTHESIA EVENT (OUTPATIENT)
Dept: SURGERY | Facility: CLINIC | Age: 65
End: 2020-07-23
Payer: COMMERCIAL

## 2020-07-23 ENCOUNTER — HOSPITAL ENCOUNTER (OUTPATIENT)
Facility: CLINIC | Age: 65
Discharge: HOME | End: 2020-07-23
Attending: UROLOGY | Admitting: UROLOGY
Payer: COMMERCIAL

## 2020-07-23 VITALS
DIASTOLIC BLOOD PRESSURE: 86 MMHG | RESPIRATION RATE: 16 BRPM | SYSTOLIC BLOOD PRESSURE: 153 MMHG | WEIGHT: 152.2 LBS | OXYGEN SATURATION: 99 % | BODY MASS INDEX: 23.89 KG/M2 | HEIGHT: 67 IN | HEART RATE: 37 BPM | TEMPERATURE: 98.8 F

## 2020-07-23 DIAGNOSIS — C67.4 MALIGNANT NEOPLASM OF POSTERIOR WALL OF URINARY BLADDER (H): ICD-10-CM

## 2020-07-23 PROCEDURE — 25000128 H RX IP 250 OP 636: Performed by: UROLOGY

## 2020-07-23 PROCEDURE — 25800030 ZZH RX IP 258 OP 636: Performed by: NURSE ANESTHETIST, CERTIFIED REGISTERED

## 2020-07-23 PROCEDURE — 25000125 ZZHC RX 250: Performed by: NURSE ANESTHETIST, CERTIFIED REGISTERED

## 2020-07-23 PROCEDURE — 36000056 ZZH SURGERY LEVEL 3 1ST 30 MIN: Performed by: UROLOGY

## 2020-07-23 PROCEDURE — 51720 TREATMENT OF BLADDER LESION: CPT | Mod: 59 | Performed by: UROLOGY

## 2020-07-23 PROCEDURE — 25000125 ZZHC RX 250: Performed by: UROLOGY

## 2020-07-23 PROCEDURE — 71000015 ZZH RECOVERY PHASE 1 LEVEL 2 EA ADDTL HR: Performed by: UROLOGY

## 2020-07-23 PROCEDURE — 37000009 ZZH ANESTHESIA TECHNICAL FEE, EACH ADDTL 15 MIN: Performed by: UROLOGY

## 2020-07-23 PROCEDURE — 71000027 ZZH RECOVERY PHASE 2 EACH 15 MINS: Performed by: UROLOGY

## 2020-07-23 PROCEDURE — 40000306 ZZH STATISTIC PRE PROC ASSESS II: Performed by: UROLOGY

## 2020-07-23 PROCEDURE — 88307 TISSUE EXAM BY PATHOLOGIST: CPT | Performed by: UROLOGY

## 2020-07-23 PROCEDURE — 71000014 ZZH RECOVERY PHASE 1 LEVEL 2 FIRST HR: Performed by: UROLOGY

## 2020-07-23 PROCEDURE — 36000058 ZZH SURGERY LEVEL 3 EA 15 ADDTL MIN: Performed by: UROLOGY

## 2020-07-23 PROCEDURE — 52235 CYSTOSCOPY AND TREATMENT: CPT | Performed by: UROLOGY

## 2020-07-23 PROCEDURE — 37000008 ZZH ANESTHESIA TECHNICAL FEE, 1ST 30 MIN: Performed by: UROLOGY

## 2020-07-23 PROCEDURE — 25000566 ZZH SEVOFLURANE, EA 15 MIN: Performed by: UROLOGY

## 2020-07-23 PROCEDURE — 27210794 ZZH OR GENERAL SUPPLY STERILE: Performed by: UROLOGY

## 2020-07-23 PROCEDURE — 25000128 H RX IP 250 OP 636: Performed by: NURSE ANESTHETIST, CERTIFIED REGISTERED

## 2020-07-23 PROCEDURE — 88307 TISSUE EXAM BY PATHOLOGIST: CPT | Mod: 26 | Performed by: UROLOGY

## 2020-07-23 RX ORDER — NALOXONE HYDROCHLORIDE 0.4 MG/ML
.1-.4 INJECTION, SOLUTION INTRAMUSCULAR; INTRAVENOUS; SUBCUTANEOUS
Status: DISCONTINUED | OUTPATIENT
Start: 2020-07-23 | End: 2020-07-23 | Stop reason: HOSPADM

## 2020-07-23 RX ORDER — ONDANSETRON 2 MG/ML
4 INJECTION INTRAMUSCULAR; INTRAVENOUS EVERY 30 MIN PRN
Status: DISCONTINUED | OUTPATIENT
Start: 2020-07-23 | End: 2020-07-23 | Stop reason: HOSPADM

## 2020-07-23 RX ORDER — ONDANSETRON 4 MG/1
4 TABLET, ORALLY DISINTEGRATING ORAL EVERY 30 MIN PRN
Status: DISCONTINUED | OUTPATIENT
Start: 2020-07-23 | End: 2020-07-23 | Stop reason: HOSPADM

## 2020-07-23 RX ORDER — CIPROFLOXACIN 500 MG/1
500 TABLET, FILM COATED ORAL 2 TIMES DAILY
Qty: 6 TABLET | Refills: 0 | Status: ON HOLD | OUTPATIENT
Start: 2020-07-23 | End: 2020-07-29

## 2020-07-23 RX ORDER — HYDROCODONE BITARTRATE AND ACETAMINOPHEN 5; 325 MG/1; MG/1
1-2 TABLET ORAL EVERY 4 HOURS PRN
Qty: 10 TABLET | Refills: 0 | Status: SHIPPED | OUTPATIENT
Start: 2020-07-23 | End: 2020-08-06

## 2020-07-23 RX ORDER — SODIUM CHLORIDE, SODIUM LACTATE, POTASSIUM CHLORIDE, CALCIUM CHLORIDE 600; 310; 30; 20 MG/100ML; MG/100ML; MG/100ML; MG/100ML
INJECTION, SOLUTION INTRAVENOUS CONTINUOUS
Status: DISCONTINUED | OUTPATIENT
Start: 2020-07-23 | End: 2020-07-23 | Stop reason: HOSPADM

## 2020-07-23 RX ORDER — DEXAMETHASONE SODIUM PHOSPHATE 10 MG/ML
INJECTION, SOLUTION INTRAMUSCULAR; INTRAVENOUS PRN
Status: DISCONTINUED | OUTPATIENT
Start: 2020-07-23 | End: 2020-07-23

## 2020-07-23 RX ORDER — HYDROMORPHONE HYDROCHLORIDE 1 MG/ML
.3-.5 INJECTION, SOLUTION INTRAMUSCULAR; INTRAVENOUS; SUBCUTANEOUS EVERY 10 MIN PRN
Status: DISCONTINUED | OUTPATIENT
Start: 2020-07-23 | End: 2020-07-23 | Stop reason: HOSPADM

## 2020-07-23 RX ORDER — FENTANYL CITRATE 50 UG/ML
25-50 INJECTION, SOLUTION INTRAMUSCULAR; INTRAVENOUS
Status: DISCONTINUED | OUTPATIENT
Start: 2020-07-23 | End: 2020-07-23 | Stop reason: HOSPADM

## 2020-07-23 RX ORDER — NEOSTIGMINE METHYLSULFATE 1 MG/ML
VIAL (ML) INJECTION PRN
Status: DISCONTINUED | OUTPATIENT
Start: 2020-07-23 | End: 2020-07-23

## 2020-07-23 RX ORDER — ONDANSETRON 2 MG/ML
INJECTION INTRAMUSCULAR; INTRAVENOUS PRN
Status: DISCONTINUED | OUTPATIENT
Start: 2020-07-23 | End: 2020-07-23

## 2020-07-23 RX ORDER — LIDOCAINE HYDROCHLORIDE 20 MG/ML
INJECTION, SOLUTION INFILTRATION; PERINEURAL PRN
Status: DISCONTINUED | OUTPATIENT
Start: 2020-07-23 | End: 2020-07-23

## 2020-07-23 RX ORDER — FENTANYL CITRATE 50 UG/ML
INJECTION, SOLUTION INTRAMUSCULAR; INTRAVENOUS PRN
Status: DISCONTINUED | OUTPATIENT
Start: 2020-07-23 | End: 2020-07-23

## 2020-07-23 RX ORDER — GLYCOPYRROLATE 0.2 MG/ML
INJECTION, SOLUTION INTRAMUSCULAR; INTRAVENOUS PRN
Status: DISCONTINUED | OUTPATIENT
Start: 2020-07-23 | End: 2020-07-23

## 2020-07-23 RX ORDER — CEFAZOLIN SODIUM 1 G/3ML
1 INJECTION, POWDER, FOR SOLUTION INTRAMUSCULAR; INTRAVENOUS SEE ADMIN INSTRUCTIONS
Status: DISCONTINUED | OUTPATIENT
Start: 2020-07-23 | End: 2020-07-23 | Stop reason: HOSPADM

## 2020-07-23 RX ORDER — CEFAZOLIN SODIUM 2 G/100ML
2 INJECTION, SOLUTION INTRAVENOUS
Status: COMPLETED | OUTPATIENT
Start: 2020-07-23 | End: 2020-07-23

## 2020-07-23 RX ORDER — PROPOFOL 10 MG/ML
INJECTION, EMULSION INTRAVENOUS PRN
Status: DISCONTINUED | OUTPATIENT
Start: 2020-07-23 | End: 2020-07-23

## 2020-07-23 RX ADMIN — GLYCOPYRROLATE 0.2 MG: 0.2 INJECTION, SOLUTION INTRAMUSCULAR; INTRAVENOUS at 09:14

## 2020-07-23 RX ADMIN — DEXAMETHASONE SODIUM PHOSPHATE 4 MG: 10 INJECTION, SOLUTION INTRAMUSCULAR; INTRAVENOUS at 09:15

## 2020-07-23 RX ADMIN — SODIUM CHLORIDE, POTASSIUM CHLORIDE, SODIUM LACTATE AND CALCIUM CHLORIDE: 600; 310; 30; 20 INJECTION, SOLUTION INTRAVENOUS at 10:42

## 2020-07-23 RX ADMIN — LIDOCAINE HYDROCHLORIDE 1 ML: 10 INJECTION, SOLUTION EPIDURAL; INFILTRATION; INTRACAUDAL; PERINEURAL at 08:11

## 2020-07-23 RX ADMIN — MIDAZOLAM 1 MG: 1 INJECTION INTRAMUSCULAR; INTRAVENOUS at 09:07

## 2020-07-23 RX ADMIN — ROCURONIUM BROMIDE 5 MG: 10 INJECTION INTRAVENOUS at 09:19

## 2020-07-23 RX ADMIN — LIDOCAINE HYDROCHLORIDE 40 MG: 20 INJECTION, SOLUTION INFILTRATION; PERINEURAL at 09:15

## 2020-07-23 RX ADMIN — SODIUM CHLORIDE, POTASSIUM CHLORIDE, SODIUM LACTATE AND CALCIUM CHLORIDE: 600; 310; 30; 20 INJECTION, SOLUTION INTRAVENOUS at 08:11

## 2020-07-23 RX ADMIN — ONDANSETRON 4 MG: 2 INJECTION INTRAMUSCULAR; INTRAVENOUS at 09:15

## 2020-07-23 RX ADMIN — FENTANYL CITRATE 50 MCG: 50 INJECTION, SOLUTION INTRAMUSCULAR; INTRAVENOUS at 09:15

## 2020-07-23 RX ADMIN — PROPOFOL 50 MG: 10 INJECTION, EMULSION INTRAVENOUS at 09:17

## 2020-07-23 RX ADMIN — GLYCOPYRROLATE 0.2 MG: 0.2 INJECTION, SOLUTION INTRAMUSCULAR; INTRAVENOUS at 10:44

## 2020-07-23 RX ADMIN — CEFAZOLIN SODIUM 2 G: 2 INJECTION, SOLUTION INTRAVENOUS at 09:07

## 2020-07-23 RX ADMIN — MITOMYCIN 40 MG: 40 INJECTION, POWDER, LYOPHILIZED, FOR SOLUTION INTRAVENOUS at 09:50

## 2020-07-23 RX ADMIN — GLYCOPYRROLATE 0.6 MG: 0.2 INJECTION, SOLUTION INTRAMUSCULAR; INTRAVENOUS at 09:52

## 2020-07-23 RX ADMIN — PROPOFOL 50 MG: 10 INJECTION, EMULSION INTRAVENOUS at 09:55

## 2020-07-23 RX ADMIN — NEOSTIGMINE METHYLSULFATE 3 MG: 1 INJECTION, SOLUTION INTRAVENOUS at 09:52

## 2020-07-23 RX ADMIN — GLYCOPYRROLATE 0.2 MG: 0.2 INJECTION, SOLUTION INTRAMUSCULAR; INTRAVENOUS at 10:42

## 2020-07-23 RX ADMIN — FENTANYL CITRATE 50 MCG: 50 INJECTION, SOLUTION INTRAMUSCULAR; INTRAVENOUS at 09:18

## 2020-07-23 RX ADMIN — PROPOFOL 50 MG: 10 INJECTION, EMULSION INTRAVENOUS at 09:39

## 2020-07-23 RX ADMIN — NEOSTIGMINE METHYLSULFATE 1 MG: 1 INJECTION, SOLUTION INTRAVENOUS at 10:02

## 2020-07-23 RX ADMIN — PROPOFOL 150 MG: 10 INJECTION, EMULSION INTRAVENOUS at 09:15

## 2020-07-23 RX ADMIN — ROCURONIUM BROMIDE 30 MG: 10 INJECTION INTRAVENOUS at 09:17

## 2020-07-23 SDOH — HEALTH STABILITY: MENTAL HEALTH: CURRENT SMOKER: 0

## 2020-07-23 ASSESSMENT — ENCOUNTER SYMPTOMS: DYSRHYTHMIAS: 1

## 2020-07-23 ASSESSMENT — MIFFLIN-ST. JEOR: SCORE: 1429

## 2020-07-23 NOTE — ANESTHESIA PREPROCEDURE EVALUATION
Anesthesia Pre-Procedure Evaluation    Patient: Marilou Saldivar   MRN: [id number] : 1955          Preoperative Diagnosis: Malignant neoplasm of posterior wall of urinary bladder (H) [C67.4]    Procedure(s):  CYSTOSCOPY, WITH TRANSURETHRAL RESECTION OF BLADDER TUMOR AND INSTILLATION OF CHEMOTHERAPEUTIC AGENT INTO BLADDER    Past Medical History:   Diagnosis Date     Atrial fibrillation (H)      Past Surgical History:   Procedure Laterality Date     EP ICD      ablation       Anesthesia Evaluation     . Pt has had prior anesthetic. Type: MAC           ROS/MED HX    ENT/Pulmonary:  - neg pulmonary ROS     Neurologic:  - neg neurologic ROS     Cardiovascular:     (+) hypertension----. Taking blood thinners : . . . :. dysrhythmias a-fib, .       METS/Exercise Tolerance:     Hematologic:  - neg hematologic  ROS       Musculoskeletal:  - neg musculoskeletal ROS       GI/Hepatic:  - neg GI/hepatic ROS       Renal/Genitourinary:  - ROS Renal section negative       Endo:  - neg endo ROS       Psychiatric:  - neg psychiatric ROS       Infectious Disease:  - neg infectious disease ROS       Malignancy:         Other:    - neg other ROS                      Physical Exam  Normal systems: cardiovascular, pulmonary and dental    Airway   Mallampati: II  TM distance: >3 FB  Neck ROM: full    Dental     Cardiovascular       Pulmonary             Lab Results   Component Value Date    WBC 5.5 2020    HGB 12.7 (L) 2020    HCT 38.9 (L) 2020     2020     2020    POTASSIUM 4.5 2020    CHLORIDE 112 (H) 2020    CO2 26 2020    BUN 24 2020    CR 1.13 2020    GLC 98 2020    DEBBY 8.6 2020    ALBUMIN 3.8 2020    PROTTOTAL 7.4 2020    ALT 30 2020    AST 20 2020    ALKPHOS 104 2020    BILITOTAL 0.5 2020    INR 1.1 2019       Preop Vitals  BP Readings from Last 3 Encounters:   20 (!) 144/76   20 118/76  "  06/17/20 (!) 152/80    Pulse Readings from Last 3 Encounters:   07/14/20 74   06/17/20 65      Resp Readings from Last 3 Encounters:   07/23/20 16   07/14/20 14   06/17/20 12    SpO2 Readings from Last 3 Encounters:   07/23/20 100%   07/14/20 100%   06/17/20 100%      Temp Readings from Last 1 Encounters:   07/23/20 98.8  F (37.1  C) (Oral)    Ht Readings from Last 1 Encounters:   07/23/20 1.702 m (5' 7\")      Wt Readings from Last 1 Encounters:   07/23/20 69 kg (152 lb 3.2 oz)    Estimated body mass index is 23.84 kg/m  as calculated from the following:    Height as of this encounter: 1.702 m (5' 7\").    Weight as of this encounter: 69 kg (152 lb 3.2 oz).       Anesthesia Plan      History & Physical Review  History and physical reviewed and following examination; no interval change.    ASA Status:  2 .    NPO Status:  > 8 hours    Plan for General with Intravenous induction. Maintenance will be Balanced.    PONV prophylaxis:  Ondansetron (or other 5HT-3) and Dexamethasone or Solumedrol    The patient is not a current smoker , Patient not instructed to abstain from smoking on day of procedure and patient did not smoke on day of surgery     Postoperative Care  Postoperative pain management:  IV analgesics.      Consents  Anesthetic plan, risks, benefits and alternatives discussed with:  Patient.  Use of blood products discussed: No .   .                 MATEO Schwab CRNA  "

## 2020-07-23 NOTE — BRIEF OP NOTE
[facility]   Urology Brief Operative Note    Pre-operative diagnosis: Bladder cancer   Post-operative diagnosis same   Procedure: Procedure(s):  Turbt>2cm with mitomycin instillation   Surgeon: Bret Ta MD   Assistants(s): None   Anesthesia: gen    Estimated blood loss: 1 ml    Total IV fluids: (See anesthesia record)   Blood transfusion: No transfusion was given during surgery   Total urine output: (See anesthesia record)  Not measured   Drains or packing: None   Specimens: none   Implants: none   Findings: See op note   Complications: None   Condition on discharge: Stable   Comments: 2 cm tumor found/ See dictated operative report for full details

## 2020-07-23 NOTE — ANESTHESIA POSTPROCEDURE EVALUATION
Patient: Marilou Saldivar    Procedure(s):  CYSTOSCOPY, WITH TRANSURETHRAL RESECTION OF BLADDER TUMOR AND INSTILLATION OF CHEMOTHERAPEUTIC AGENT INTO BLADDER    Diagnosis:Malignant neoplasm of posterior wall of urinary bladder (H) [C67.4]  Diagnosis Additional Information: No value filed.    Anesthesia Type:  General    Note:  Anesthesia Post Evaluation    Patient location during evaluation: Phase 2  Patient participation: Able to fully participate in evaluation  Level of consciousness: awake  Pain management: adequate  Airway patency: patent  Cardiovascular status: acceptable and hemodynamically stable  Respiratory status: acceptable, room air and nonlabored ventilation  Hydration status: stable  PONV: none     Anesthetic complications: None    Comments: Patient was happy with the anesthesia care received and no anesthesia related complications were noted. Pt had some bradycardia in PACU and was treated with robinul and resolved.  EKG was done, SB for rhythm and no concerning changes noted.  Pt at discharge is asymptomatic and  VSS.  I will follow up with the patient again if it is needed.        Last vitals:  Vitals:    07/23/20 1145 07/23/20 1200 07/23/20 1215   BP: 139/81 (!) 154/84 (!) 153/86   Pulse: 59 64 (!) 37   Resp: 16     Temp:      SpO2: 100% 98% 99%         Electronically Signed By: MATEO Alvarado CRNA  July 23, 2020  1:50 PM

## 2020-07-23 NOTE — DISCHARGE INSTRUCTIONS
Discharge Instructions following Cystoscopy  [facility]   Dr. Ta    Diet:    Return to the diet you were on before surgery, unless you were given specific diet instructions.    It is important to drink 6-8 glasses of fluids per day at home.  At least 3-4 glasses should be water.  Activities:    Walk short distances and increase as your strength allows.    You may climb stairs.    Do not do strenuous exercise or heavy lifting until approved by the surgeon.    Do not drive while taking narcotic pain medications.    If urine becomes red, decrease activity and drink more fluids.  Bathing/Incision Care    You may take a shower.    Call your surgeon if you have these signs or symptoms:    Fever greater than 101 oF or chills.    Inability or difficulty urinating.  Excessive blood in urine.    Increased pain that does not improve with rest or pain medicine.    Any questions or concerns.    Follow-up Appointment at [facility]:  Thursday August 6th at 10:15 ([id number])  [facility]   Adult Discharge Orders & Instructions     For 24 hours after surgery    1. Get plenty of rest.  A responsible adult must stay with you for at least 24 hours after you leave the hospital.   2. Do not drive or use heavy equipment.  If you have weakness or tingling, don't drive or use heavy equipment until this feeling goes away.  3. Do not drink alcohol.  4. Avoid strenuous or risky activities.  Ask for help when climbing stairs.   5. You may feel lightheaded.  If so, sit for a few minutes before standing.  Have someone help you get up.   6. You may have a slight fever. Call the doctor if your fever is over 100 F (37.7 C) (taken under the tongue) or lasts longer than 24 hours.  7. You may have a dry mouth, a sore throat, muscle aches or trouble sleeping.  These should go away after 24 hours.  8. Do not make important or legal decisions.  We don t expect you to have any problems from the surgery or  treatment you had today. Just in case, here s what to do if you have pain, upset stomach (nausea), bleeding or infection:  Pain:  Take medicines your doctor has prescribed or over-the-counter medicine they have suggested. Resting and using ice packs can help, too. For surgery on an arm or leg, raise it on a pillow to ease swelling. Call your doctor if these methods don t work.  Copyright [employer]ad[employer], Licensed under [employer]  Upset stomach (nausea):  Take anti-nausea medicine approved by your doctor. Drink clear liquids like apple juice, ginger ale, broth or 7-Up. Be sure to drink enough fluids. Rest can help, too. Move to normal foods when you re ready. Bleeding:  In the first 24 hours, you may see a little blood on your dressing, about the size of a quarter. You don t need to worry about this much blood, but if the blood spot keeps getting bigger:    Put pressure on the wound if you can, AND    Call your doctor.  Copyright [employer], Licensed under [employer]  Fever/Infection: Please call your doctor if you have any of these signs:    Redness    Swelling    Wound feels warm    Pain gets worse    Bad-smelling fluid leaks from wound    Fever or chills  Call your doctor for any of the followin.  It has been over 8 to 10 hours since surgery and you are still not able to urinate (pass water).    2.  Headache for over 24 hours.    3.  Numbness, tingling or weakness in your legs the day after surgery (if you had spinal anesthesia).    24 Hour Nurse advice line: [phone]

## 2020-07-23 NOTE — ANESTHESIA CARE TRANSFER NOTE
Patient: Marilou Saldivar    Procedure(s):  CYSTOSCOPY, WITH TRANSURETHRAL RESECTION OF BLADDER TUMOR AND INSTILLATION OF CHEMOTHERAPEUTIC AGENT INTO BLADDER    Diagnosis: Malignant neoplasm of posterior wall of urinary bladder (H) [C67.4]  Diagnosis Additional Information: No value filed.    Anesthesia Type:   General     Note:  Airway :Room Air  Patient transferred to:PACU  Handoff Report: Identifed the Patient, Identified the Reponsible Provider, Reviewed the pertinent medical history, Discussed the surgical course, Reviewed Intra-OP anesthesia mangement and issues during anesthesia, Set expectations for post-procedure period and Allowed opportunity for questions and acknowledgement of understanding      Vitals: (Last set prior to Anesthesia Care Transfer)    CRNA VITALS  7/23/2020 0943 - 7/23/2020 1014      7/23/2020             Pulse:  63    SpO2:  100 %    Resp Rate (observed):  (!) 4                Electronically Signed By: MATEO Schwab CRNA  July 23, 2020  10:14 AM

## 2020-07-23 NOTE — OP NOTE
Preop diagnosis: Bladder cancer    Postop diagnosis: Same    Procedure done:  #1 transurethral resection of bladder cancer greater 2 cm  #2 instillation of mitomycin    Procedure    Patient brought to the operating room and placed in a dorsolithotomy position after general anesthesia has been induced.  He was draped and prepped in the usual surgical fashion.  Preop antibiotic was given.  Resectoscope then placed into the bladder.  The cancer was found in the right posterior wall.  It measured over 2 cm in size.  It was resected without any difficulty.  The tumors were sent to surgical pathology.  16 [place] Gongora cath was placed.  Mitomycin instillation was done.  Patient tolerated procedure well.  No complications identified during the procedure.  There was minimal bleeding during the operation.

## 2020-07-24 NOTE — OR NURSING
[facility] Same Day Surgery  Discharge Call Back  Marilou Saldivar  1955  MRN: [id number]  Home: [phone] (home)   PCP: Krishan Hernández Lynndyl    We are calling to see how you're doing since your surgery/procedure with us?   Comments: very good  Clinical Questions  1. Have you had time to look at your discharge instructions? Do you have any questions in regards to the instructions?   Comment: yes/no  2. Do you feel your pain is being controlled with the regimen the surgeon sent you home on? (ie: prescription medications, over the counter pain medications, ice packs)   Comments: yes  3. Have you noticed any drainage on your dressing? Do you know what to do if you have bleeding as a result of your procedure?   Comments: no/yes  4. Have you had any nausea/vomiting? Do you know how to treat this?   Comment: no/yes  5. Have you had any signs/symptoms of infection? (ie: fever, swelling, heat, drainage or redness) Do you know what to do if you have?   Comment: no/yes  6. Do you have a follow up appointment made with your surgeon? Do you have a number to contact them at if you need it?   Comment: yes/yes    -spouse removed catheter this morning        You may be randomly selected to fill out a [facility] survey. We would appreciate you taking the time to fill this out. It is important to us if you would answer all of the questions on the survey.

## 2020-07-26 ENCOUNTER — HOSPITAL ENCOUNTER (INPATIENT)
Facility: CLINIC | Age: 65
LOS: 2 days | Discharge: HOME | DRG: 988 | End: 2020-07-29
Attending: EMERGENCY MEDICINE | Admitting: HOSPITALIST
Payer: COMMERCIAL

## 2020-07-26 ENCOUNTER — NURSE TRIAGE (OUTPATIENT)
Dept: NURSING | Facility: CLINIC | Age: 65
End: 2020-07-26

## 2020-07-26 DIAGNOSIS — R31.0 GROSS HEMATURIA: ICD-10-CM

## 2020-07-26 DIAGNOSIS — Z79.01 CHRONIC ANTICOAGULATION: ICD-10-CM

## 2020-07-26 DIAGNOSIS — C67.4 MALIGNANT NEOPLASM OF POSTERIOR WALL OF URINARY BLADDER (H): Primary | ICD-10-CM

## 2020-07-26 DIAGNOSIS — Z90.79 S/P TURP: ICD-10-CM

## 2020-07-26 DIAGNOSIS — Z79.01 ANTICOAGULATED: ICD-10-CM

## 2020-07-26 DIAGNOSIS — C67.9 MALIGNANT NEOPLASM OF URINARY BLADDER, UNSPECIFIED SITE (H): ICD-10-CM

## 2020-07-26 PROBLEM — R31.9 HEMATURIA: Status: ACTIVE | Noted: 2020-07-26

## 2020-07-26 PROBLEM — D62 ANEMIA DUE TO BLOOD LOSS, ACUTE: Status: ACTIVE | Noted: 2020-07-26

## 2020-07-26 LAB
ABO + RH BLD: NORMAL
ABO + RH BLD: NORMAL
ALBUMIN UR-MCNC: >499 MG/DL
ANION GAP SERPL CALCULATED.3IONS-SCNC: 8 MMOL/L (ref 3–14)
APPEARANCE UR: ABNORMAL
APTT PPP: 30 SEC (ref 22–37)
BASOPHILS # BLD AUTO: 0 10E9/L (ref 0–0.2)
BASOPHILS NFR BLD AUTO: 0.3 %
BILIRUB UR QL STRIP: NEGATIVE
BLD GP AB SCN SERPL QL: NORMAL
BLD PROD TYP BPU: NORMAL
BLD PROD TYP BPU: NORMAL
BLD UNIT ID BPU: 0
BLOOD BANK CMNT PATIENT-IMP: NORMAL
BLOOD PRODUCT CODE: NORMAL
BPU ID: NORMAL
BUN SERPL-MCNC: 27 MG/DL (ref 7–30)
CALCIUM SERPL-MCNC: 7.9 MG/DL (ref 8.5–10.1)
CHLORIDE SERPL-SCNC: 109 MMOL/L (ref 94–109)
CO2 SERPL-SCNC: 25 MMOL/L (ref 20–32)
COLOR UR AUTO: ABNORMAL
CREAT SERPL-MCNC: 1.71 MG/DL (ref 0.66–1.25)
DIFFERENTIAL METHOD BLD: ABNORMAL
EOSINOPHIL NFR BLD AUTO: 0.5 %
ERYTHROCYTE [DISTWIDTH] IN BLOOD BY AUTOMATED COUNT: 11.4 % (ref 10–15)
GFR SERPL CREATININE-BSD FRML MDRD: 41 ML/MIN/{1.73_M2}
GLUCOSE SERPL-MCNC: 140 MG/DL (ref 70–99)
GLUCOSE UR STRIP-MCNC: 150 MG/DL
HCT VFR BLD AUTO: 23.7 % (ref 40–53)
HCT VFR BLD AUTO: 23.9 % (ref 40–53)
HGB BLD-MCNC: 6 G/DL (ref 13.3–17.7)
HGB BLD-MCNC: 7 G/DL (ref 13.3–17.7)
HGB BLD-MCNC: 8 G/DL (ref 13.3–17.7)
HGB BLD-MCNC: 8.1 G/DL (ref 13.3–17.7)
HGB UR QL STRIP: ABNORMAL
IMM GRANULOCYTES # BLD: 0 10E9/L (ref 0–0.4)
IMM GRANULOCYTES NFR BLD: 0.2 %
INR PPP: 1.53 (ref 0.86–1.14)
KETONES UR STRIP-MCNC: NEGATIVE MG/DL
LEUKOCYTE ESTERASE UR QL STRIP: NEGATIVE
LYMPHOCYTES # BLD AUTO: 1.2 10E9/L (ref 0.8–5.3)
LYMPHOCYTES NFR BLD AUTO: 13.8 %
MCH RBC QN AUTO: 31.4 PG (ref 26.5–33)
MCHC RBC AUTO-ENTMCNC: 33.9 G/DL (ref 31.5–36.5)
MCV RBC AUTO: 93 FL (ref 78–100)
MONOCYTES # BLD AUTO: 0.5 10E9/L (ref 0–1.3)
MONOCYTES NFR BLD AUTO: 5.1 %
NEUTROPHILS # BLD AUTO: 7 10E9/L (ref 1.6–8.3)
NEUTROPHILS NFR BLD AUTO: 80.1 %
NITRATE UR QL: NEGATIVE
NRBC # BLD AUTO: 0 10*3/UL
NRBC BLD AUTO-RTO: 0 /100
NUM BPU REQUESTED: 1
PH UR STRIP: 7 PH (ref 5–7)
PLATELET # BLD AUTO: 201 10E9/L (ref 150–450)
POTASSIUM SERPL-SCNC: 4.5 MMOL/L (ref 3.4–5.3)
RBC # BLD AUTO: 2.58 10E12/L (ref 4.4–5.9)
RBC #/AREA URNS AUTO: 33 /HPF (ref 0–2)
SODIUM SERPL-SCNC: 142 MMOL/L (ref 133–144)
SOURCE: ABNORMAL
SP GR UR STRIP: 1.03 (ref 1–1.03)
SPECIMEN EXP DATE BLD: NORMAL
TRANSFUSION STATUS PATIENT QL: NORMAL
TRANSFUSION STATUS PATIENT QL: NORMAL
UROBILINOGEN UR STRIP-MCNC: 0 MG/DL (ref 0–2)
WBC # BLD AUTO: 8.7 10E9/L (ref 4–11)
WBC #/AREA URNS AUTO: ABNORMAL /HPF (ref 0–5)

## 2020-07-26 PROCEDURE — 96375 TX/PRO/DX INJ NEW DRUG ADDON: CPT | Performed by: EMERGENCY MEDICINE

## 2020-07-26 PROCEDURE — 25000132 ZZH RX MED GY IP 250 OP 250 PS 637: Performed by: HOSPITALIST

## 2020-07-26 PROCEDURE — 81001 URINALYSIS AUTO W/SCOPE: CPT | Performed by: EMERGENCY MEDICINE

## 2020-07-26 PROCEDURE — 96361 HYDRATE IV INFUSION ADD-ON: CPT | Performed by: EMERGENCY MEDICINE

## 2020-07-26 PROCEDURE — U0003 INFECTIOUS AGENT DETECTION BY NUCLEIC ACID (DNA OR RNA); SEVERE ACUTE RESPIRATORY SYNDROME CORONAVIRUS 2 (SARS-COV-2) (CORONAVIRUS DISEASE [COVID-19]), AMPLIFIED PROBE TECHNIQUE, MAKING USE OF HIGH THROUGHPUT TECHNOLOGIES AS DESCRIBED BY CMS-2020-01-R: HCPCS | Performed by: PEDIATRICS

## 2020-07-26 PROCEDURE — 25000128 H RX IP 250 OP 636: Performed by: FAMILY MEDICINE

## 2020-07-26 PROCEDURE — 96376 TX/PRO/DX INJ SAME DRUG ADON: CPT | Performed by: EMERGENCY MEDICINE

## 2020-07-26 PROCEDURE — 25800030 ZZH RX IP 258 OP 636: Performed by: PEDIATRICS

## 2020-07-26 PROCEDURE — 86923 COMPATIBILITY TEST ELECTRIC: CPT | Performed by: EMERGENCY MEDICINE

## 2020-07-26 PROCEDURE — 0TBB8ZX EXCISION OF BLADDER, VIA NATURAL OR ARTIFICIAL OPENING ENDOSCOPIC, DIAGNOSTIC: ICD-10-PCS | Performed by: UROLOGY

## 2020-07-26 PROCEDURE — 25800030 ZZH RX IP 258 OP 636: Performed by: EMERGENCY MEDICINE

## 2020-07-26 PROCEDURE — 86901 BLOOD TYPING SEROLOGIC RH(D): CPT | Performed by: EMERGENCY MEDICINE

## 2020-07-26 PROCEDURE — 25000125 ZZHC RX 250: Performed by: EMERGENCY MEDICINE

## 2020-07-26 PROCEDURE — G0378 HOSPITAL OBSERVATION PER HR: HCPCS

## 2020-07-26 PROCEDURE — 25000128 H RX IP 250 OP 636: Performed by: PEDIATRICS

## 2020-07-26 PROCEDURE — 86900 BLOOD TYPING SEROLOGIC ABO: CPT | Performed by: EMERGENCY MEDICINE

## 2020-07-26 PROCEDURE — 85730 THROMBOPLASTIN TIME PARTIAL: CPT | Performed by: EMERGENCY MEDICINE

## 2020-07-26 PROCEDURE — 99285 EMERGENCY DEPT VISIT HI MDM: CPT | Mod: 25 | Performed by: EMERGENCY MEDICINE

## 2020-07-26 PROCEDURE — 80048 BASIC METABOLIC PNL TOTAL CA: CPT | Performed by: EMERGENCY MEDICINE

## 2020-07-26 PROCEDURE — P9016 RBC LEUKOCYTES REDUCED: HCPCS | Performed by: EMERGENCY MEDICINE

## 2020-07-26 PROCEDURE — 85610 PROTHROMBIN TIME: CPT | Performed by: EMERGENCY MEDICINE

## 2020-07-26 PROCEDURE — 85018 HEMOGLOBIN: CPT | Performed by: HOSPITALIST

## 2020-07-26 PROCEDURE — 85014 HEMATOCRIT: CPT | Performed by: EMERGENCY MEDICINE

## 2020-07-26 PROCEDURE — 36415 COLL VENOUS BLD VENIPUNCTURE: CPT | Performed by: HOSPITALIST

## 2020-07-26 PROCEDURE — 87086 URINE CULTURE/COLONY COUNT: CPT | Performed by: EMERGENCY MEDICINE

## 2020-07-26 PROCEDURE — 25000128 H RX IP 250 OP 636: Performed by: EMERGENCY MEDICINE

## 2020-07-26 PROCEDURE — 96374 THER/PROPH/DIAG INJ IV PUSH: CPT | Performed by: EMERGENCY MEDICINE

## 2020-07-26 PROCEDURE — 86850 RBC ANTIBODY SCREEN: CPT | Performed by: EMERGENCY MEDICINE

## 2020-07-26 PROCEDURE — 25000132 ZZH RX MED GY IP 250 OP 250 PS 637: Performed by: EMERGENCY MEDICINE

## 2020-07-26 PROCEDURE — 99219 ZZC INITIAL OBSERVATION CARE,LEVL II: CPT | Performed by: HOSPITALIST

## 2020-07-26 PROCEDURE — 25800025 ZZH RX 258: Performed by: HOSPITALIST

## 2020-07-26 PROCEDURE — 85025 COMPLETE CBC W/AUTO DIFF WBC: CPT | Performed by: EMERGENCY MEDICINE

## 2020-07-26 PROCEDURE — 51702 INSERT TEMP BLADDER CATH: CPT | Performed by: EMERGENCY MEDICINE

## 2020-07-26 PROCEDURE — 0T9B8ZZ DRAINAGE OF BLADDER, VIA NATURAL OR ARTIFICIAL OPENING ENDOSCOPIC: ICD-10-PCS | Performed by: UROLOGY

## 2020-07-26 PROCEDURE — 85018 HEMOGLOBIN: CPT | Performed by: EMERGENCY MEDICINE

## 2020-07-26 RX ORDER — ACETAMINOPHEN 650 MG/1
650 SUPPOSITORY RECTAL EVERY 4 HOURS PRN
Status: DISCONTINUED | OUTPATIENT
Start: 2020-07-26 | End: 2020-07-29 | Stop reason: HOSPADM

## 2020-07-26 RX ORDER — ONDANSETRON 2 MG/ML
4 INJECTION INTRAMUSCULAR; INTRAVENOUS EVERY 30 MIN PRN
Status: DISCONTINUED | OUTPATIENT
Start: 2020-07-26 | End: 2020-07-26

## 2020-07-26 RX ORDER — ONDANSETRON 2 MG/ML
4 INJECTION INTRAMUSCULAR; INTRAVENOUS EVERY 6 HOURS PRN
Status: DISCONTINUED | OUTPATIENT
Start: 2020-07-26 | End: 2020-07-29 | Stop reason: HOSPADM

## 2020-07-26 RX ORDER — MORPHINE SULFATE 2 MG/ML
2 INJECTION, SOLUTION INTRAMUSCULAR; INTRAVENOUS
Status: COMPLETED | OUTPATIENT
Start: 2020-07-26 | End: 2020-07-26

## 2020-07-26 RX ORDER — ONDANSETRON 4 MG/1
4 TABLET, ORALLY DISINTEGRATING ORAL EVERY 6 HOURS PRN
Status: DISCONTINUED | OUTPATIENT
Start: 2020-07-26 | End: 2020-07-29 | Stop reason: HOSPADM

## 2020-07-26 RX ORDER — MORPHINE SULFATE 4 MG/ML
4 INJECTION, SOLUTION INTRAMUSCULAR; INTRAVENOUS
Status: COMPLETED | OUTPATIENT
Start: 2020-07-26 | End: 2020-07-26

## 2020-07-26 RX ORDER — HYDROMORPHONE HYDROCHLORIDE 1 MG/ML
0.5 INJECTION, SOLUTION INTRAMUSCULAR; INTRAVENOUS; SUBCUTANEOUS
Status: DISCONTINUED | OUTPATIENT
Start: 2020-07-26 | End: 2020-07-29 | Stop reason: HOSPADM

## 2020-07-26 RX ORDER — ACETAMINOPHEN 325 MG/1
650 TABLET ORAL EVERY 4 HOURS PRN
Status: DISCONTINUED | OUTPATIENT
Start: 2020-07-26 | End: 2020-07-29 | Stop reason: HOSPADM

## 2020-07-26 RX ORDER — METOPROLOL SUCCINATE 25 MG/1
25 TABLET, EXTENDED RELEASE ORAL DAILY
Status: DISCONTINUED | OUTPATIENT
Start: 2020-07-26 | End: 2020-07-29

## 2020-07-26 RX ORDER — SODIUM CHLORIDE 9 MG/ML
INJECTION, SOLUTION INTRAVENOUS CONTINUOUS
Status: DISCONTINUED | OUTPATIENT
Start: 2020-07-26 | End: 2020-07-26

## 2020-07-26 RX ORDER — HYDROCODONE BITARTRATE AND ACETAMINOPHEN 5; 325 MG/1; MG/1
1-2 TABLET ORAL EVERY 4 HOURS PRN
Status: DISCONTINUED | OUTPATIENT
Start: 2020-07-26 | End: 2020-07-29 | Stop reason: HOSPADM

## 2020-07-26 RX ORDER — SODIUM CHLORIDE, SODIUM LACTATE, POTASSIUM CHLORIDE, CALCIUM CHLORIDE 600; 310; 30; 20 MG/100ML; MG/100ML; MG/100ML; MG/100ML
INJECTION, SOLUTION INTRAVENOUS CONTINUOUS
Status: DISCONTINUED | OUTPATIENT
Start: 2020-07-26 | End: 2020-07-28

## 2020-07-26 RX ORDER — ACETAMINOPHEN 325 MG/1
975 TABLET ORAL ONCE
Status: COMPLETED | OUTPATIENT
Start: 2020-07-26 | End: 2020-07-26

## 2020-07-26 RX ORDER — NALOXONE HYDROCHLORIDE 0.4 MG/ML
.1-.4 INJECTION, SOLUTION INTRAMUSCULAR; INTRAVENOUS; SUBCUTANEOUS
Status: DISCONTINUED | OUTPATIENT
Start: 2020-07-26 | End: 2020-07-27

## 2020-07-26 RX ORDER — LIDOCAINE HYDROCHLORIDE 20 MG/ML
JELLY TOPICAL ONCE
Status: COMPLETED | OUTPATIENT
Start: 2020-07-26 | End: 2020-07-26

## 2020-07-26 RX ADMIN — SODIUM CHLORIDE: 9 INJECTION, SOLUTION INTRAVENOUS at 05:13

## 2020-07-26 RX ADMIN — SODIUM CHLORIDE 3000 ML: 900 IRRIGANT IRRIGATION at 08:03

## 2020-07-26 RX ADMIN — SODIUM CHLORIDE 3000 ML: 900 IRRIGANT IRRIGATION at 20:21

## 2020-07-26 RX ADMIN — HYDROCODONE BITARTRATE AND ACETAMINOPHEN 2 TABLET: 5; 325 TABLET ORAL at 08:11

## 2020-07-26 RX ADMIN — SODIUM CHLORIDE, POTASSIUM CHLORIDE, SODIUM LACTATE AND CALCIUM CHLORIDE: 600; 310; 30; 20 INJECTION, SOLUTION INTRAVENOUS at 16:26

## 2020-07-26 RX ADMIN — SODIUM CHLORIDE: 9 INJECTION, SOLUTION INTRAVENOUS at 12:58

## 2020-07-26 RX ADMIN — SODIUM CHLORIDE 1000 ML: 9 INJECTION, SOLUTION INTRAVENOUS at 04:25

## 2020-07-26 RX ADMIN — SODIUM CHLORIDE 3000 ML: 900 IRRIGANT IRRIGATION at 21:59

## 2020-07-26 RX ADMIN — METOPROLOL SUCCINATE 25 MG: 25 TABLET, FILM COATED, EXTENDED RELEASE ORAL at 10:32

## 2020-07-26 RX ADMIN — SODIUM CHLORIDE 3000 ML: 900 IRRIGANT IRRIGATION at 23:24

## 2020-07-26 RX ADMIN — MORPHINE SULFATE 4 MG: 4 INJECTION, SOLUTION INTRAMUSCULAR; INTRAVENOUS at 07:30

## 2020-07-26 RX ADMIN — ACETAMINOPHEN 975 MG: 325 TABLET, FILM COATED ORAL at 06:16

## 2020-07-26 RX ADMIN — LIDOCAINE HYDROCHLORIDE: 20 JELLY TOPICAL at 03:00

## 2020-07-26 RX ADMIN — MORPHINE SULFATE 2 MG: 2 INJECTION, SOLUTION INTRAMUSCULAR; INTRAVENOUS at 06:40

## 2020-07-26 RX ADMIN — HYDROMORPHONE HYDROCHLORIDE 0.5 MG: 1 INJECTION, SOLUTION INTRAMUSCULAR; INTRAVENOUS; SUBCUTANEOUS at 08:31

## 2020-07-26 RX ADMIN — ONDANSETRON 4 MG: 2 INJECTION INTRAMUSCULAR; INTRAVENOUS at 06:38

## 2020-07-26 NOTE — ED NOTES
Reviewed home meds with pt.  Pt's wife went home at this time, plan for admission to the hospital.  Catheter remains patent.  Urine changes from lighter pink in color to red.  Pt complaining of bladder discomfort.  Tylenol administered.  Pt has call light and instructed to call if he wants something stronger for pain.

## 2020-07-26 NOTE — ED NOTES
Pt states he is feeling lightheaded, dizzy, and clammy. Pt states he is having a lot of pain now. Pts VS are WDL. Pts ABCs are WDL. Writer will administer additional Morphine.

## 2020-07-26 NOTE — ED NOTES
Three way urinary catheter placed.  Irrigation started at this time, 3000 ml bag hung.  Pt tolerated placement.

## 2020-07-26 NOTE — ED NOTES
ED Nursing criteria listed below was addressed during verbal handoff:     Abnormal vitals: Yes  Abnormal results: Yes  Med Reconciliation completed: Yes  Meds given in ED: Yes  Any Overdue Meds: N/A  Core Measures: N/A  Isolation: N/A  Special needs: Yes  Skin assessment: Yes    Observation Patient  Education provided: Yes    Report given to Reny LANG.  Plan to transport pt to med/surg at 0730.

## 2020-07-26 NOTE — ED PROVIDER NOTES
History     Chief Complaint   Patient presents with     Hematuria     HPI  Marilou Saldivar is a 65 year old male who presents with hematuria.  He recently underwent cystoscopy anoscopy and TURP, has bladder cancer.  Procedure was done on 7/23/2020 by Dr. Ta.  He was sent home with a Gongora catheter, and wife was instructed on how to remove this at home.  She did so yesterday and did not have any difficulties.  He also resumed his Xarelto, which he is taking for A. fib, and had been held for 5 days prior to the procedure.  Marilou Bansal noticed increasing frequency of urination, from needing to go every hour to needing to go every 10 minutes.  He also noticed increasing hematuria, and last void was gross blood.  He denies running a fever, no nausea or vomiting, no constipation or diarrhea.    Allergies:  No Known Allergies    Problem List:    Patient Active Problem List    Diagnosis Date Noted     Chronic anticoagulation 07/14/2020     Priority: Medium     Malignant neoplasm of posterior wall of urinary bladder (H) 07/09/2020     Priority: Medium     Added automatically from request for surgery [id number]       HTN (hypertension) 09/12/2019     Priority: Medium     S/P ablation of atrial fibrillation 09/12/2019     Priority: Medium     PAF (paroxysmal atrial fibrillation) (H) 05/16/2019     Priority: Medium        Past Medical History:    Past Medical History:   Diagnosis Date     Atrial fibrillation (H)        Past Surgical History:    Past Surgical History:   Procedure Laterality Date     CYSTOSCOPY, TRANSURETHRAL RESECTION (TUR) TUMOR BLADDER INSTILL CHEMOTHERAPY, COMBINED N/A 7/23/2020    Procedure: CYSTOSCOPY, WITH TRANSURETHRAL RESECTION OF BLADDER TUMOR AND INSTILLATION OF CHEMOTHERAPEUTIC AGENT INTO BLADDER;  Surgeon: Bret Ta MD;  Location: [facility] OR     EP ICD      ablation       Family History:    Family History   Problem Relation Age of Onset     Heart Disease Mother         heart bypass      Jc's disease Mother      Heart Disease Father      No Known Problems Brother      Unknown/Adopted Sister      No Known Problems Son      No Known Problems Daughter      No Known Problems Brother      Heart Disease Brother          of MI at 48       Social History:  Marital Status:   [2]  Social History     Tobacco Use     Smoking status: Never Smoker     Smokeless tobacco: Never Used   Substance Use Topics     Alcohol use: Never     Frequency: Never     Drug use: Never        Medications:    amLODIPine (NORVASC) 2.5 MG tablet  ciprofloxacin (CIPRO) 500 MG tablet  HYDROcodone-acetaminophen (NORCO) 5-325 MG tablet  Metoprolol Succinate 25 MG CS24  rivaroxaban ANTICOAGULANT (XARELTO) 20 MG TABS tablet  rOPINIRole (REQUIP) 0.5 MG tablet          Review of Systems   All other systems reviewed and are negative.      Physical Exam   BP: 125/74  Heart Rate: 74  Temp: 98.8  F (37.1  C)  Resp: 18  SpO2: 100 %      Physical Exam  Vitals signs and nursing note reviewed.   Constitutional:       General: He is not in acute distress.     Appearance: Normal appearance. He is not diaphoretic.   HENT:      Head: Atraumatic.   Eyes:      General: No scleral icterus.     Pupils: Pupils are equal, round, and reactive to light.   Cardiovascular:      Rate and Rhythm: Normal rate and regular rhythm.      Heart sounds: Normal heart sounds.   Pulmonary:      Effort: Pulmonary effort is normal. No respiratory distress.      Breath sounds: Normal breath sounds.   Abdominal:      General: Bowel sounds are normal. There is no distension.      Palpations: Abdomen is soft.      Tenderness: There is no abdominal tenderness.   Musculoskeletal:         General: No tenderness.   Skin:     General: Skin is warm.      Findings: No rash.   Neurological:      Mental Status: He is alert.   Psychiatric:         Mood and Affect: Mood normal.         Behavior: Behavior normal.         ED Course        Procedures               Critical Care  time:  none               Results for orders placed or performed during the hospital encounter of 07/26/20 (from the past 24 hour(s))   Routine UA with microscopic   Result Value Ref Range    Color Urine Red     Appearance Urine Cloudy     Glucose Urine 150 (A) NEG^Negative mg/dL    Bilirubin Urine Negative NEG^Negative    Ketones Urine Negative NEG^Negative mg/dL    Specific Gravity Urine 1.031 1.003 - 1.035    Blood Urine Large (A) NEG^Negative    pH Urine 7.0 5.0 - 7.0 pH    Protein Albumin Urine >499 (A) NEG^Negative mg/dL    Urobilinogen mg/dL 0.0 0.0 - 2.0 mg/dL    Nitrite Urine Negative NEG^Negative    Leukocyte Esterase Urine Negative NEG^Negative    Source Midstream Urine     WBC Urine None 0 - 5 /HPF    RBC Urine 33 (H) 0 - 2 /HPF   CBC with platelets differential   Result Value Ref Range    WBC 8.7 4.0 - 11.0 10e9/L    RBC Count 2.58 (L) 4.4 - 5.9 10e12/L    Hemoglobin 8.1 (L) 13.3 - 17.7 g/dL    Hematocrit 23.9 (L) 40.0 - 53.0 %    MCV 93 78 - 100 fl    MCH 31.4 26.5 - 33.0 pg    MCHC 33.9 31.5 - 36.5 g/dL    RDW 11.4 10.0 - 15.0 %    Platelet Count 201 150 - 450 10e9/L    Diff Method Automated Method     % Neutrophils 80.1 %    % Lymphocytes 13.8 %    % Monocytes 5.1 %    % Eosinophils 0.5 %    % Basophils 0.3 %    % Immature Granulocytes 0.2 %    Nucleated RBCs 0 0 /100    Absolute Neutrophil 7.0 1.6 - 8.3 10e9/L    Absolute Lymphocytes 1.2 0.8 - 5.3 10e9/L    Absolute Monocytes 0.5 0.0 - 1.3 10e9/L    Absolute Basophils 0.0 0.0 - 0.2 10e9/L    Abs Immature Granulocytes 0.0 0 - 0.4 10e9/L    Absolute Nucleated RBC 0.0    Basic metabolic panel   Result Value Ref Range    Sodium 142 133 - 144 mmol/L    Potassium 4.5 3.4 - 5.3 mmol/L    Chloride 109 94 - 109 mmol/L    Carbon Dioxide 25 20 - 32 mmol/L    Anion Gap 8 3 - 14 mmol/L    Glucose 140 (H) 70 - 99 mg/dL    Urea Nitrogen 27 7 - 30 mg/dL    Creatinine 1.71 (H) 0.66 - 1.25 mg/dL    GFR Estimate 41 (L) >60 mL/min/[1.73_m2]    GFR Estimate If Black 47  (L) >60 mL/min/[1.73_m2]    Calcium 7.9 (L) 8.5 - 10.1 mg/dL   INR   Result Value Ref Range    INR 1.53 (H) 0.86 - 1.14   Partial thromboplastin time   Result Value Ref Range    PTT 30 22 - 37 sec   ABO/Rh type and screen   Result Value Ref Range    ABO O     RH(D) Neg     Antibody Screen Neg     Test Valid Only At [facility]        Specimen Expires 07/29/2020    Hemoglobin and hematocrit   Result Value Ref Range    Hemoglobin 8.0 (L) 13.3 - 17.7 g/dL    Hematocrit 23.7 (L) 40.0 - 53.0 %       Medications   lidocaine (XYLOCAINE) 2 % external gel (has no administration in time range)       Assessments & Plan (with Medical Decision Making)  Marilou is a 65-year-old male with known history of bladder cancer who recently underwent cystoscopy and TURP by Dr. Ta, resection of the bladder tumor, on 7/23/2020.  Also had bladder irrigation with chemotherapy at that time.  He was discharged with a Gongora catheter in place, which his wife was instructed on how to remove at home.  She did so without difficulty yesterday.  However, patient notes that he is started to have hematuria again, and increasing frequency of urination.  He is currently on Xarelto for A. fib, just started yesterday after the procedure.  See history and focused physical exam as above  Patient is calm, cooperative, no acute distress.  Vital signs are within normal limits.  Esme the work-up mentation, including lab work and bladder irrigation.  Patient is willing to proceed.  Lab results as above.  His hemoglobin has dropped 4 points in the last 6 weeks, was 12, now 8.  Is unsurprising given the history of gross hematuria and bladder cancer, as well as being anticoagulated.  Urine was sent, no evidence of pyuria, but is grossly bloody.  There are small clots that are also being passed in the urine.    Three-way Gongora catheter was inserted, bladder irrigation was begun.  Approximately snf through the irrigation, RN was reassessing the  patient and noticed that he was pale and diaphoretic.  The patient did not have any complaints of chest pain, shortness of breath, lightheadedness or dizziness at that time, but did notice some pressure and discomfort in his lower abdomen.  Wife also noticed that there was some leaking around the catheter.  Patient was placed in Trendelenburg, a peripheral IV was started and he was bolused with 1 L of normal saline.  Repeat H&H was drawn, hemoglobin remained stable.  Pressure responded nicely to a normal saline bolus.  Patient was reassessed and is no longer pale and clammy.  Instructed both him and his wife that I will contact urology on-call to discuss further management.  May need to be transferred to a different facility since urology services are usually not available at [place] over the weekend.  0554 Was able to get a hold of Dr. Ta, urologist on-call.  He does recall the patient's case and says that it is not unusual for bleeding to occur post procedure for at least 1 to 2 weeks.  He does think that the bleeding and gross hematuria is exacerbated by the patient being on Xarelto.  Patient will need to stop the Xarelto, and should follow-up closely with Dr. Ta in the office in 1 day.  If the patient does not feel comfortable being discharged from the ER, could consider hospitalizing the patient to continue bladder irrigation.  He does not have any suggestions for urologic intervention at this time.  Discussed this with patient and his wife.  They are agreeable to continuing bladder irrigation, and patient says that he feels very weak and is unsure about going home.  Wife expresses concerns about being able to care for him at home if he is very weak.  Called and discussed with hospitalist, accepted the patient for observation stay to continue bladder irrigation, serial H&H.  Will hold Xarelto.     I have reviewed the nursing notes.    I have reviewed the findings, diagnosis, plan and need for follow up  with the patient.       ED to Inpatient Handoff:    Discussed with Lambert at 0615  Patient accepted for Observation Stay  Pending studies include COVID swab  Code Status: Not Addressed           Current Discharge Medication List          Final diagnoses:   Gross hematuria   Malignant neoplasm of urinary bladder, unspecified site (H)   S/P TURP   Anticoagulated       7/26/2020   [facility] EMERGENCY DEPARTMENT     Maria C Naik DO  07/26/20 0816

## 2020-07-26 NOTE — H&P
[facility]    History and Physical  Hospitalist       Date of Admission:  7/26/2020    Assessment & Plan   Principal Problem:    Hematuria    Assessment: status post bladder surgery, hx of xarelto due to afib hx. Was off xarelto since 7/18/20 and restarted on 7/25/20, pt has the surgery on 7/23/20    Plan: will HOLD xarelto. Continue CBI, monitor HB closely, urology aware, at this point only recommend CBI    Active Problems:    PAF (paroxysmal atrial fibrillation) (H)    Assessment: on metoprolol and xarelto, [id number] score at this time is 2 due to age and also hypertension.     Plan: as above, HOLD xarelto, continue metoprolol      HTN (hypertension)    Assessment: on amlodipine and metoprolol, bp controlled, has 1x hypotensive episode    Plan: hold amlodipine, continue metoprolol      Anemia due to blood loss, acute    Assessment: due to above, HB is 8    Plan: monitor hgb q8, transfuse if HB<7        Marilou Saldivar is a 65 year old male who presents with hematuria. Pt status post bladder surgery on 7/23/20 due to lesion on the bladder, apparently pt take xarelto in the past due to afib. xarelto was held for the surgery and was restarted on 7/25/20, apparently pt start to have bleeding after the xarelto taken. hgb was noted to be low at 8 and was 12 on 6/17/20, pt has no fever or chill. Urology called by ER physician, at this time only recommend bladder irrigation and HOLD xarelto. Pt otherwise stable,       # Pain Assessment:  Current Pain Score 7/26/2020   Patient currently in pain? -   Pain score (0-10) 5   - Marilou is experiencing pain due to hematuria. Pain management was discussed and the plan was created in a collaborative fashion.  Marilou's response to the current recommendations: engaged  - Opioid regimen: norco  - Response to opioid medications: Reduction of symptoms   - Bowel regimen: not needed        DVT Prophylaxis: Pneumatic Compression Devices, was on xarelto, last  dose was 7/25/20  Code Status: Full Code    Disposition: Expected discharge in 1-2 days once bleeding stop, .    Jimmie Verdin    Primary Care Physician   [facility]    Chief Complaint   hematuria    History is obtained from the patient    History of Present Illness   Marilou Saldivar is a 65 year old male who presents with hematuria. Pt status post bladder surgery on 7/23/20 due to lesion on the bladder, apparently pt take xarelto in the past due to afib. xarelto was held for the surgery and was restarted on 7/25/20, apparently pt start to have bleeding after the xarelto taken. hgb was noted to be low at 8 and was 12 on 6/17/20, pt has no fever or chill. Urology called by ER physician, at this time only recommend bladder irrigation and HOLD xarelto. Pt otherwise stable,       Past Medical History    I have reviewed this patient's medical history and updated it with pertinent information if needed.   Past Medical History:   Diagnosis Date     Atrial fibrillation (H)        Past Surgical History   I have reviewed this patient's surgical history and updated it with pertinent information if needed.  Past Surgical History:   Procedure Laterality Date     CYSTOSCOPY, TRANSURETHRAL RESECTION (TUR) TUMOR BLADDER INSTILL CHEMOTHERAPY, COMBINED N/A 7/23/2020    Procedure: CYSTOSCOPY, WITH TRANSURETHRAL RESECTION OF BLADDER TUMOR AND INSTILLATION OF CHEMOTHERAPEUTIC AGENT INTO BLADDER;  Surgeon: Bret Ta MD;  Location: [facility] OR     EP ICD      ablation       Prior to Admission Medications   Prior to Admission Medications   Prescriptions Last Dose Informant Patient Reported? Taking?   HYDROcodone-acetaminophen (NORCO) 5-325 MG tablet Past Week at Unknown time  No Yes   Sig: Take 1-2 tablets by mouth every 4 hours as needed for moderate to severe pain   Metoprolol Succinate 25 MG CS24 7/26/2020 at 62864  Yes Yes   Sig: TAKE 1 TABLET BY MOUTH ONCE DAILY HOLD IF PULSE IS LESS THEN 60 BPM   amLODIPine  (NORVASC) 2.5 MG tablet 2020 at 0800  No Yes   Sig: Take 1 tablet (2.5 mg) by mouth daily   ciprofloxacin (CIPRO) 500 MG tablet 2020 at Unknown time  No Yes   Sig: Take 1 tablet (500 mg) by mouth 2 times daily for 3 days   rOPINIRole (REQUIP) 0.5 MG tablet Past Week at Unknown time  Yes Yes   Si.5 mg   rivaroxaban ANTICOAGULANT (XARELTO) 20 MG TABS tablet 2020 at Unknown time  Yes Yes   Sig: Take 20 mg by mouth      Facility-Administered Medications: None     Allergies   No Known Allergies    Social History   I have reviewed this patient's social history and updated it with pertinent information if needed. Marilou Saldivar  reports that he has never smoked. He has never used smokeless tobacco. He reports that he does not drink alcohol or use drugs.    Family History   I have reviewed this patient's family history and updated it with pertinent information if needed.   Family History   Problem Relation Age of Onset     Heart Disease Mother         heart bypass     Cj's disease Mother      Heart Disease Father      No Known Problems Brother      Unknown/Adopted Sister      No Known Problems Son      No Known Problems Daughter      No Known Problems Brother      Heart Disease Brother          of MI at 48       Review of Systems   The 10 point Review of Systems is negative other than noted in the HPI or here.     Physical Exam   Temp: 98.8  F (37.1  C) Temp src: Oral BP: 135/76 Pulse: 71 Heart Rate: 74 Resp: 18 SpO2: 100 %      Vital Signs with Ranges  Temp:  [98.8  F (37.1  C)] 98.8  F (37.1  C)  Pulse:  [56-74] 71  Heart Rate:  [74] 74  Resp:  [18] 18  BP: ()/(45-81) 135/76  SpO2:  [99 %-100 %] 100 %  0 lbs 0 oz    Constitutional: alert, oriented, mild to moderate distress  Eyes: PERRLA  HEENT: head normocephalic, atraumatic, ENT normal  Respiratory: clear to auscultation bilaterally  Cardiovascular: regular rate and rhythm,   GI: supple, non tender, non distended, normal bowel sound    Lymph/Hematologic: no lymph node enlargement   Genitourinary: not examined  Skin: no rash or bruise  Musculoskeletal: no leg edema  Neurologic: alert, oriented, normal speech, no abnormal movement  Psychiatric: affect normal     Data     Data reviewed today:    Recent Results (from the past 168 hour(s))   Asymptomatic COVID-19 Virus (Coronavirus) by PCR    Collection Time: 07/21/20  3:13 PM    Specimen: Nasopharyngeal   Result Value Ref Range    COVID-19 Virus PCR to [facility] - Source Nasopharyngeal     COVID-19 Virus PCR to [facility] - Result Not Detected    Routine UA with microscopic    Collection Time: 07/26/20  2:02 AM   Result Value Ref Range    Color Urine Red     Appearance Urine Cloudy     Glucose Urine 150 (A) NEG^Negative mg/dL    Bilirubin Urine Negative NEG^Negative    Ketones Urine Negative NEG^Negative mg/dL    Specific Gravity Urine 1.031 1.003 - 1.035    Blood Urine Large (A) NEG^Negative    pH Urine 7.0 5.0 - 7.0 pH    Protein Albumin Urine >499 (A) NEG^Negative mg/dL    Urobilinogen mg/dL 0.0 0.0 - 2.0 mg/dL    Nitrite Urine Negative NEG^Negative    Leukocyte Esterase Urine Negative NEG^Negative    Source Midstream Urine     WBC Urine None 0 - 5 /HPF    RBC Urine 33 (H) 0 - 2 /HPF   CBC with platelets differential    Collection Time: 07/26/20  3:32 AM   Result Value Ref Range    WBC 8.7 4.0 - 11.0 10e9/L    RBC Count 2.58 (L) 4.4 - 5.9 10e12/L    Hemoglobin 8.1 (L) 13.3 - 17.7 g/dL    Hematocrit 23.9 (L) 40.0 - 53.0 %    MCV 93 78 - 100 fl    MCH 31.4 26.5 - 33.0 pg    MCHC 33.9 31.5 - 36.5 g/dL    RDW 11.4 10.0 - 15.0 %    Platelet Count 201 150 - 450 10e9/L    Diff Method Automated Method     % Neutrophils 80.1 %    % Lymphocytes 13.8 %    % Monocytes 5.1 %    % Eosinophils 0.5 %    % Basophils 0.3 %    % Immature Granulocytes 0.2 %    Nucleated RBCs 0 0 /100    Absolute Neutrophil 7.0 1.6 - 8.3 10e9/L    Absolute Lymphocytes 1.2 0.8 - 5.3 10e9/L    Absolute Monocytes 0.5 0.0 - 1.3 10e9/L     Absolute Basophils 0.0 0.0 - 0.2 10e9/L    Abs Immature Granulocytes 0.0 0 - 0.4 10e9/L    Absolute Nucleated RBC 0.0    Basic metabolic panel    Collection Time: 07/26/20  3:32 AM   Result Value Ref Range    Sodium 142 133 - 144 mmol/L    Potassium 4.5 3.4 - 5.3 mmol/L    Chloride 109 94 - 109 mmol/L    Carbon Dioxide 25 20 - 32 mmol/L    Anion Gap 8 3 - 14 mmol/L    Glucose 140 (H) 70 - 99 mg/dL    Urea Nitrogen 27 7 - 30 mg/dL    Creatinine 1.71 (H) 0.66 - 1.25 mg/dL    GFR Estimate 41 (L) >60 mL/min/[1.73_m2]    GFR Estimate If Black 47 (L) >60 mL/min/[1.73_m2]    Calcium 7.9 (L) 8.5 - 10.1 mg/dL   INR    Collection Time: 07/26/20  3:32 AM   Result Value Ref Range    INR 1.53 (H) 0.86 - 1.14   Partial thromboplastin time    Collection Time: 07/26/20  3:32 AM   Result Value Ref Range    PTT 30 22 - 37 sec   ABO/Rh type and screen    Collection Time: 07/26/20  4:21 AM   Result Value Ref Range    ABO O     RH(D) Neg     Antibody Screen Neg     Test Valid Only At [facility]        Specimen Expires 07/29/2020    Hemoglobin and hematocrit    Collection Time: 07/26/20  4:21 AM   Result Value Ref Range    Hemoglobin 8.0 (L) 13.3 - 17.7 g/dL    Hematocrit 23.7 (L) 40.0 - 53.0 %      No results found for this or any previous visit (from the past 24 hour(s)).

## 2020-07-26 NOTE — TELEPHONE ENCOUNTER
Caller is complaining of not being able to empty his bladder fully and is passing pure blood with urination. Caller had a TURP 2 days ago and catheter was removed one day ago. Caller denies any fever. Triage guidelines recommend to go to ED. Caller verbalized and understands directives.  COVID 19 Nurse Triage Plan/Patient Instructions    Please be aware that novel coronavirus (COVID-19) may be circulating in the community. If you develop symptoms such as fever, cough, or SOB or if you have concerns about the presence of another infection including coronavirus (COVID-19), please contact your health care provider or visit [email or web address].     Disposition/Instructions    ED Visit recommended. Follow protocol based instructions.     Bring Your Own Device:  Please also bring your smart device(s) (smart phones, tablets, laptops) and their charging cables for your personal use and to communicate with your care team during your visit.    Thank you for taking steps to prevent the spread of this virus.  o Limit your contact with others.  o Wear a simple mask to cover your cough.  o Wash your hands well and often.    Resources    [facility]: About COVID-19: [email or web address]    CDC: What to Do If You're Sick: www.cdc.gov/coronavirus/2019-ncov/about/steps-when-sick.html    CDC: Ending Home Isolation: www.cdc.gov/coronavirus/2019-ncov/hcp/disposition-in-home-patients.html     CDC: Caring for Someone: www.cdc.gov/coronavirus/2019-ncov/if-you-are-sick/care-for-someone.html     [facility]: Interim Guidance for Hospital Discharge to Home: www.health.[facility].mn.us/diseases/coronavirus/hcp/hospdischarge.pdf    [facility] clinical trials (COVID-19 research studies): [email or web address]-trials     Below are the COVID-19 hotlines at the [facility] ([facility]). Interpreters are available.   o For health questions: Call 947-714-0164 or 1-190.877.8702 (7 a.m. to 7 p.m.)  o For  questions about schools and childcare: Call 420-471-6214 or 1-236.605.1122 (7 a.m. to 7 p.m.)                     Reason for Disposition    Passing pure blood or large blood clots (i.e., size > a dime) (Exception: lisa or small strands)    Additional Information    Negative: Shock suspected (e.g., cold/pale/clammy skin, too weak to stand, low BP, rapid pulse)    Negative: Sounds like a life-threatening emergency to the triager    Negative: Urinary catheter, questions about    Negative: Recent back or abdominal injury    Negative: Recent genital injury    Negative: [1] Unable to urinate (or only a few drops) > 4 hours AND [2] bladder feels very full (e.g., palpable bladder or strong urge to urinate)    Protocols used: URINE - BLOOD IN-A-AH

## 2020-07-26 NOTE — PROGRESS NOTES
S-(situation): Patient arrives to room 271 via cart from ED    B-(background): Blood in urine    A-(assessment): Pt is A&O.  VSS  Afebrile.  Having severe pain in bladder.  Abdomen distended.  Lung sounds clear.    R-(recommendations): Orders reviewed with pt. Will monitor patient per MD orders.     Inpatient nursing criteria listed below were met:    Health care directives status obtained and documented: Yes  SCD's Documented: Yes  Skin issues/needs documented:Yes  Isolation addressed and Signage used: NA  Fall Prevention: Care plan updated, Education given and documented NA  Care Plan initiated: Yes  Education Assessment documented:Yes  Education Documented (Pre-existing chronic infection such as, MRSA/VRE need education on admission): Yes  Allergies Reviewed: Yes  Admission Medication Reconciliation completed: Yes  New medication patient education completed and documented (Possible Side Effects of Common Medications handout): Yes  Home medications if not able to send immediately home with family stored here: No  Reminder note placed in discharge instructions: No  Discharge planning review completed (admission navigator) No

## 2020-07-26 NOTE — PROGRESS NOTES
[facility]    Medicine Progress Note - Hospitalist Service       Date of Admission:  7/26/2020  Assessment & Plan   65-year-old man admitted earlier this morning due to gross hematuria after recent cystoscopy on July 23 with resection of bladder tumor and subsequent reinitiation of chronic anticoagulation with Xarelto yesterday.  He continues to have gross hematuria after initiation of continuous bladder irrigation and has had intermittent clotting causing obstruction of his bladder catheter with increased pain requiring IV narcotics.  Worsening anemia is evident which could be due to blood losses but also hemodilution.  However, his hemodynamic status has improved with resolution of hypotension from the ER so far today.    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    Chronic anticoagulation-Xarelto    Anemia due to blood loss, acute    PAF (paroxysmal atrial fibrillation) (H)    HTN (hypertension)    S/P ablation of atrial fibrillation    Continue bladder irrigation in accordance with recommendations from urology  Holding chronic prophylactic Xarelto anticoagulation for atrial fibrillation because risk of bleeding appears to outweigh benefit at this time and he has not had any known recent thromboembolic events  Continue IV fluids but will switch from normal saline to lactated Ringer's for maintenance  Hold chronic antihypertensive medications including metoprolol for now  Continue to monitor serial hemoglobin, consider blood transfusion for hemoglobin less than 7 or if his hemodynamic status deteriorates       Diet: Regular Diet Adult    DVT Prophylaxis: Currently observation status  [place] Catheter: in place, indication:    Code Status: Full Code           Disposition Plan   Expected discharge: 1 to 2 days, recommended to prior living arrangement once Gross hematuria has subsided and he is  hemodynamically stable.  Entered: Sinan Cabral MD 07/26/2020, 1:12 PM       The patient's care was discussed with the Bedside Nurse and Patient.    Sinan Cabral MD  Hospitalist Service  [facility]    ______________________________________________________________________    Interval History   He was admitted early this morning.  He is tired.  Nursing reports that he had increasing abdominal pain that did not seem to respond to doses of IV morphine given in the ER nor to doses of oral Norco administered in the hospital.  His pain has now improved after starting IV Dilaudid.  Nursing reports that continuous bladder irrigation remains grossly bloody and that at one point in time his bladder catheter was obstructed from blood clots but they were able to flush the catheter with return of good flow through the catheter subsequently.  Urinary output appears more clear than earlier this morning.  He has been afebrile.  After initial hypotension in the ER, his blood pressure has normalized.  Oxygenation remains normal.    Data reviewed today: I reviewed all medications, new labs and imaging results over the last 24 hours. I personally reviewed no images or EKG's today.    Physical Exam   Vital Signs: Temp: 98.2  F (36.8  C) Temp src: Oral BP: 114/63 Pulse: 78 Heart Rate: 78 Resp: 14 SpO2: 100 % O2 Device: None (Room air)    Weight: 0 lbs 0 oz  General Appearance: Appears tired, mildly diaphoretic while resting in bed  Other: Opens eyes to voice but seems to fall back asleep    Data   Recent Labs   Lab 07/26/20  1211 07/26/20  0421 07/26/20  0332   WBC  --   --  8.7   HGB 7.0* 8.0* 8.1*   MCV  --   --  93   PLT  --   --  201   INR  --   --  1.53*   NA  --   --  142   POTASSIUM  --   --  4.5   CHLORIDE  --   --  109   CO2  --   --  25   BUN  --   --  27   CR  --   --  1.71*   ANIONGAP  --   --  8   DEBBY  --   --  7.9*   GLC  --   --  140*     Medications     lactated ringers       sodium  chloride 0.9% (bag)         [Held by provider] metoprolol succinate ER  25 mg Oral Daily

## 2020-07-26 NOTE — ED NOTES
Spoke with provider about change in urine.  At 0320 when urine bag was emptied the urine appeared to be clearing was lighter pink.  At this time, 0440, urine appeared thicker and brighter red.  Updated provider.

## 2020-07-27 ENCOUNTER — PREP FOR PROCEDURE (OUTPATIENT)
Dept: UROLOGY | Facility: CLINIC | Age: 65
End: 2020-07-27

## 2020-07-27 ENCOUNTER — ANESTHESIA (OUTPATIENT)
Dept: SURGERY | Facility: CLINIC | Age: 65
DRG: 988 | End: 2020-07-27
Payer: COMMERCIAL

## 2020-07-27 ENCOUNTER — ANESTHESIA EVENT (OUTPATIENT)
Dept: SURGERY | Facility: CLINIC | Age: 65
DRG: 988 | End: 2020-07-27
Payer: COMMERCIAL

## 2020-07-27 DIAGNOSIS — R31.0 GROSS HEMATURIA: Primary | ICD-10-CM

## 2020-07-27 PROBLEM — N17.9 ACUTE KIDNEY INJURY (H): Status: ACTIVE | Noted: 2020-07-27

## 2020-07-27 PROBLEM — N13.9 ACUTE URINARY OBSTRUCTION: Status: ACTIVE | Noted: 2020-07-27

## 2020-07-27 LAB
ANION GAP SERPL CALCULATED.3IONS-SCNC: 4 MMOL/L (ref 3–14)
BACTERIA SPEC CULT: NO GROWTH
BUN SERPL-MCNC: 26 MG/DL (ref 7–30)
CALCIUM SERPL-MCNC: 7.9 MG/DL (ref 8.5–10.1)
CHLORIDE SERPL-SCNC: 116 MMOL/L (ref 94–109)
CO2 SERPL-SCNC: 27 MMOL/L (ref 20–32)
COPATH REPORT: NORMAL
CREAT SERPL-MCNC: 1.78 MG/DL (ref 0.66–1.25)
GFR SERPL CREATININE-BSD FRML MDRD: 39 ML/MIN/{1.73_M2}
GLUCOSE SERPL-MCNC: 88 MG/DL (ref 70–99)
HGB BLD-MCNC: 6.8 G/DL (ref 13.3–17.7)
HGB BLD-MCNC: 7.1 G/DL (ref 13.3–17.7)
HGB BLD-MCNC: 7.6 G/DL (ref 13.3–17.7)
Lab: NORMAL
POTASSIUM SERPL-SCNC: 4.6 MMOL/L (ref 3.4–5.3)
SARS-COV-2 RNA SPEC QL NAA+PROBE: NOT DETECTED
SODIUM SERPL-SCNC: 147 MMOL/L (ref 133–144)
SPECIMEN SOURCE: NORMAL
SPECIMEN SOURCE: NORMAL

## 2020-07-27 PROCEDURE — G0378 HOSPITAL OBSERVATION PER HR: HCPCS

## 2020-07-27 PROCEDURE — 25800025 ZZH RX 258: Performed by: HOSPITALIST

## 2020-07-27 PROCEDURE — 99223 1ST HOSP IP/OBS HIGH 75: CPT | Mod: AI | Performed by: PEDIATRICS

## 2020-07-27 PROCEDURE — 88342 IMHCHEM/IMCYTCHM 1ST ANTB: CPT | Mod: 26 | Performed by: UROLOGY

## 2020-07-27 PROCEDURE — 36000050 ZZH SURGERY LEVEL 2 1ST 30 MIN: Performed by: UROLOGY

## 2020-07-27 PROCEDURE — 85018 HEMOGLOBIN: CPT | Performed by: NURSE ANESTHETIST, CERTIFIED REGISTERED

## 2020-07-27 PROCEDURE — 25800030 ZZH RX IP 258 OP 636: Performed by: PEDIATRICS

## 2020-07-27 PROCEDURE — 37000008 ZZH ANESTHESIA TECHNICAL FEE, 1ST 30 MIN: Performed by: UROLOGY

## 2020-07-27 PROCEDURE — 12000000 ZZH R&B MED SURG/OB

## 2020-07-27 PROCEDURE — 25000564 ZZH DESFLURANE, EA 15 MIN: Performed by: UROLOGY

## 2020-07-27 PROCEDURE — 52001 CYSTO W/IRRG&EVAC MLT CLOTS: CPT | Mod: 59 | Performed by: UROLOGY

## 2020-07-27 PROCEDURE — 25000128 H RX IP 250 OP 636: Performed by: NURSE ANESTHETIST, CERTIFIED REGISTERED

## 2020-07-27 PROCEDURE — 85018 HEMOGLOBIN: CPT | Performed by: HOSPITALIST

## 2020-07-27 PROCEDURE — 27210794 ZZH OR GENERAL SUPPLY STERILE: Performed by: UROLOGY

## 2020-07-27 PROCEDURE — 40000306 ZZH STATISTIC PRE PROC ASSESS II: Performed by: UROLOGY

## 2020-07-27 PROCEDURE — 25000125 ZZHC RX 250: Performed by: UROLOGY

## 2020-07-27 PROCEDURE — 80048 BASIC METABOLIC PNL TOTAL CA: CPT | Performed by: HOSPITALIST

## 2020-07-27 PROCEDURE — 52204 CYSTOSCOPY W/BIOPSY(S): CPT | Mod: 51 | Performed by: UROLOGY

## 2020-07-27 PROCEDURE — 88342 IMHCHEM/IMCYTCHM 1ST ANTB: CPT | Performed by: UROLOGY

## 2020-07-27 PROCEDURE — 36000052 ZZH SURGERY LEVEL 2 EA 15 ADDTL MIN: Performed by: UROLOGY

## 2020-07-27 PROCEDURE — 88341 IMHCHEM/IMCYTCHM EA ADD ANTB: CPT | Performed by: UROLOGY

## 2020-07-27 PROCEDURE — 36415 COLL VENOUS BLD VENIPUNCTURE: CPT | Performed by: HOSPITALIST

## 2020-07-27 PROCEDURE — 88305 TISSUE EXAM BY PATHOLOGIST: CPT | Performed by: UROLOGY

## 2020-07-27 PROCEDURE — 25000128 H RX IP 250 OP 636: Performed by: PEDIATRICS

## 2020-07-27 PROCEDURE — 88305 TISSUE EXAM BY PATHOLOGIST: CPT | Mod: 26 | Performed by: UROLOGY

## 2020-07-27 PROCEDURE — 25000125 ZZHC RX 250: Performed by: NURSE ANESTHETIST, CERTIFIED REGISTERED

## 2020-07-27 PROCEDURE — 37000009 ZZH ANESTHESIA TECHNICAL FEE, EACH ADDTL 15 MIN: Performed by: UROLOGY

## 2020-07-27 PROCEDURE — 88341 IMHCHEM/IMCYTCHM EA ADD ANTB: CPT | Mod: 26 | Performed by: UROLOGY

## 2020-07-27 RX ORDER — CEFAZOLIN SODIUM 1 G/3ML
1 INJECTION, POWDER, FOR SOLUTION INTRAMUSCULAR; INTRAVENOUS SEE ADMIN INSTRUCTIONS
Status: DISCONTINUED | OUTPATIENT
Start: 2020-07-27 | End: 2020-07-28

## 2020-07-27 RX ORDER — OXYCODONE HYDROCHLORIDE 5 MG/1
5 TABLET ORAL EVERY 4 HOURS PRN
Status: DISCONTINUED | OUTPATIENT
Start: 2020-07-27 | End: 2020-07-29 | Stop reason: HOSPADM

## 2020-07-27 RX ORDER — DEXAMETHASONE SODIUM PHOSPHATE 10 MG/ML
INJECTION, SOLUTION INTRAMUSCULAR; INTRAVENOUS PRN
Status: DISCONTINUED | OUTPATIENT
Start: 2020-07-27 | End: 2020-07-27

## 2020-07-27 RX ORDER — FENTANYL CITRATE 50 UG/ML
INJECTION, SOLUTION INTRAMUSCULAR; INTRAVENOUS PRN
Status: DISCONTINUED | OUTPATIENT
Start: 2020-07-27 | End: 2020-07-27

## 2020-07-27 RX ORDER — ONDANSETRON 2 MG/ML
4 INJECTION INTRAMUSCULAR; INTRAVENOUS EVERY 30 MIN PRN
Status: DISCONTINUED | OUTPATIENT
Start: 2020-07-27 | End: 2020-07-28

## 2020-07-27 RX ORDER — CEFAZOLIN SODIUM 2 G/100ML
2 INJECTION, SOLUTION INTRAVENOUS
Status: COMPLETED | OUTPATIENT
Start: 2020-07-27 | End: 2020-07-27

## 2020-07-27 RX ORDER — FENTANYL CITRATE 50 UG/ML
25-50 INJECTION, SOLUTION INTRAMUSCULAR; INTRAVENOUS
Status: DISCONTINUED | OUTPATIENT
Start: 2020-07-27 | End: 2020-07-28

## 2020-07-27 RX ORDER — NALOXONE HYDROCHLORIDE 0.4 MG/ML
.1-.4 INJECTION, SOLUTION INTRAMUSCULAR; INTRAVENOUS; SUBCUTANEOUS
Status: ACTIVE | OUTPATIENT
Start: 2020-07-27 | End: 2020-07-28

## 2020-07-27 RX ORDER — ONDANSETRON 4 MG/1
4 TABLET, ORALLY DISINTEGRATING ORAL EVERY 30 MIN PRN
Status: DISCONTINUED | OUTPATIENT
Start: 2020-07-27 | End: 2020-07-28

## 2020-07-27 RX ORDER — CEFAZOLIN SODIUM 1 G
1 VIAL (EA) INJECTION SEE ADMIN INSTRUCTIONS
Status: CANCELLED | OUTPATIENT
Start: 2020-07-27

## 2020-07-27 RX ORDER — LIDOCAINE HYDROCHLORIDE 20 MG/ML
INJECTION, SOLUTION INFILTRATION; PERINEURAL PRN
Status: DISCONTINUED | OUTPATIENT
Start: 2020-07-27 | End: 2020-07-27

## 2020-07-27 RX ORDER — LIDOCAINE HYDROCHLORIDE 20 MG/ML
JELLY TOPICAL PRN
Status: DISCONTINUED | OUTPATIENT
Start: 2020-07-27 | End: 2020-07-27 | Stop reason: HOSPADM

## 2020-07-27 RX ORDER — PROPOFOL 10 MG/ML
INJECTION, EMULSION INTRAVENOUS PRN
Status: DISCONTINUED | OUTPATIENT
Start: 2020-07-27 | End: 2020-07-27

## 2020-07-27 RX ORDER — PROPOFOL 10 MG/ML
INJECTION, EMULSION INTRAVENOUS CONTINUOUS PRN
Status: DISCONTINUED | OUTPATIENT
Start: 2020-07-27 | End: 2020-07-27

## 2020-07-27 RX ORDER — SODIUM CHLORIDE, SODIUM LACTATE, POTASSIUM CHLORIDE, CALCIUM CHLORIDE 600; 310; 30; 20 MG/100ML; MG/100ML; MG/100ML; MG/100ML
INJECTION, SOLUTION INTRAVENOUS CONTINUOUS
Status: DISCONTINUED | OUTPATIENT
Start: 2020-07-27 | End: 2020-07-29

## 2020-07-27 RX ORDER — ONDANSETRON 2 MG/ML
INJECTION INTRAMUSCULAR; INTRAVENOUS PRN
Status: DISCONTINUED | OUTPATIENT
Start: 2020-07-27 | End: 2020-07-27

## 2020-07-27 RX ADMIN — LIDOCAINE HYDROCHLORIDE 60 MG: 20 INJECTION, SOLUTION INFILTRATION; PERINEURAL at 17:45

## 2020-07-27 RX ADMIN — ONDANSETRON 4 MG: 2 INJECTION INTRAMUSCULAR; INTRAVENOUS at 18:00

## 2020-07-27 RX ADMIN — PROPOFOL 50 MG: 10 INJECTION, EMULSION INTRAVENOUS at 17:45

## 2020-07-27 RX ADMIN — MIDAZOLAM 2 MG: 1 INJECTION INTRAMUSCULAR; INTRAVENOUS at 17:29

## 2020-07-27 RX ADMIN — SODIUM CHLORIDE, POTASSIUM CHLORIDE, SODIUM LACTATE AND CALCIUM CHLORIDE: 600; 310; 30; 20 INJECTION, SOLUTION INTRAVENOUS at 18:09

## 2020-07-27 RX ADMIN — FENTANYL CITRATE 25 MCG: 50 INJECTION, SOLUTION INTRAMUSCULAR; INTRAVENOUS at 17:47

## 2020-07-27 RX ADMIN — PROPOFOL 125 MCG/KG/MIN: 10 INJECTION, EMULSION INTRAVENOUS at 17:45

## 2020-07-27 RX ADMIN — SODIUM CHLORIDE: 900 IRRIGANT IRRIGATION at 11:15

## 2020-07-27 RX ADMIN — SODIUM CHLORIDE: 900 IRRIGANT IRRIGATION at 09:27

## 2020-07-27 RX ADMIN — SODIUM CHLORIDE: 900 IRRIGANT IRRIGATION at 12:15

## 2020-07-27 RX ADMIN — SODIUM CHLORIDE 3000 ML: 900 IRRIGANT IRRIGATION at 14:49

## 2020-07-27 RX ADMIN — SODIUM CHLORIDE, POTASSIUM CHLORIDE, SODIUM LACTATE AND CALCIUM CHLORIDE: 600; 310; 30; 20 INJECTION, SOLUTION INTRAVENOUS at 07:47

## 2020-07-27 RX ADMIN — SODIUM CHLORIDE: 900 IRRIGANT IRRIGATION at 08:30

## 2020-07-27 RX ADMIN — CEFAZOLIN SODIUM 2 G: 2 INJECTION, SOLUTION INTRAVENOUS at 17:31

## 2020-07-27 RX ADMIN — DEXAMETHASONE SODIUM PHOSPHATE 10 MG: 10 INJECTION, SOLUTION INTRAMUSCULAR; INTRAVENOUS at 18:00

## 2020-07-27 RX ADMIN — PROPOFOL 30 MG: 10 INJECTION, EMULSION INTRAVENOUS at 17:59

## 2020-07-27 RX ADMIN — FENTANYL CITRATE 25 MCG: 50 INJECTION, SOLUTION INTRAMUSCULAR; INTRAVENOUS at 18:00

## 2020-07-27 RX ADMIN — SODIUM CHLORIDE 3000 ML: 900 IRRIGANT IRRIGATION at 15:48

## 2020-07-27 SDOH — HEALTH STABILITY: MENTAL HEALTH: CURRENT SMOKER: 0

## 2020-07-27 ASSESSMENT — ACTIVITIES OF DAILY LIVING (ADL)
SWALLOWING: 0-->SWALLOWS FOODS/LIQUIDS WITHOUT DIFFICULTY
RETIRED_COMMUNICATION: 0-->UNDERSTANDS/COMMUNICATES WITHOUT DIFFICULTY
COGNITION: 0 - NO COGNITION ISSUES REPORTED
TRANSFERRING: 0-->INDEPENDENT
FALL_HISTORY_WITHIN_LAST_SIX_MONTHS: NO
AMBULATION: 0-->INDEPENDENT
BATHING: 0-->INDEPENDENT
DRESS: 0-->INDEPENDENT
RETIRED_EATING: 0-->INDEPENDENT
ADLS_ACUITY_SCORE: 15
TOILETING: 0-->INDEPENDENT

## 2020-07-27 ASSESSMENT — ENCOUNTER SYMPTOMS: DYSRHYTHMIAS: 1

## 2020-07-27 NOTE — PROVIDER NOTIFICATION
DATE:  7/27/2020   TIME OF RECEIPT FROM LAB:  2003  LAB TEST:  Hemogobin  LAB VALUE:  6.0  RESULTS GIVEN WITH READ-BACK TO (PROVIDER):  Dr Vasquez  TIME LAB VALUE REPORTED TO PROVIDER:   Ed

## 2020-07-27 NOTE — PROGRESS NOTES
Is on continues bladder irrigation.  Blood pressure slightly elevated this AM.  Was having pain in bladder, was irrigated removing blood clots and pt pain decreased.  Blood pressure continued in the 150's after irrigation.  Charge Nurse updated.  Afebrile. Lung sounds clear.   A&O x4. Skin is intact. IV site asymptomatic is running LR @ 75 ml/hr. Did received 1 unit of blood overnight due to hemoglobin at 6.0.  After transfusion increased to 6.8.  Charge nurse and MD updated. Order to repeat hemoglobin at 4 am . Tolerated that well with no reactions.  Able to make needs known and uses call light appropriately. Continue to monitor per care plan.

## 2020-07-27 NOTE — OP NOTE
Preop diagnosis: Gross hematuria status post TURBT    Postop diagnosis: Blood clot retention    Procedure done:  #1 cystoscopy and bladder clot evacuation  #2 cystoscopy and bladder biopsy and fulguration    Findings: Large bladder blood clots without any active bleeding.    Procedure    Patient brought the open room and placed in a dorsolithotomy position after sedation has been induced.  He was draped and prepped in the usual surgical fashion.  Patient received preop antibiotics.  A resectoscope was placed into the bladder.  Upon entering the bladder a large blood clots identified.  I then placed a 6-hole catheter into the bladder and the bladder was irrigated.  This took quite a bit of time because the blood clot is quite large and hard to break down.  After all the blood clots have been removed from the bladder the 24 Japanese resectoscope some placed back into the bladder.  There are no active bleeding.  Giving that he had recent path report that show no muscle tissue on recent bladder biopsy I decided to perform another bladder biopsy.  This is done without any difficulty.  Specimen was sent to surgical pathology.  No bleeding identified.  A 22 Japanese three-way catheter was placed at the end of procedure.  I recheck his hemoglobin in the OR which did not seem to change from recent hemoglobin this morning.  I suspect that the bleeding is old and there is no active bleeding going on right now.

## 2020-07-27 NOTE — PROGRESS NOTES
[facility]    History and Physical  Hospitalist       Date of Admission:  7/26/2020    Assessment & Plan   Marilou Saldivar is a 65 year old male who presents with persistent gross hematuria after urologic procedure on July 23.  He has had associated acute blood loss anemia with relative hypotension and appears to have acute kidney injury with known single kidney at baseline.  Presentation was concerning for acute urinary obstruction from blood clots and obstructive uropathy although the acute obstruction has improved with placement of irrigation catheter and initiation of continuous bladder irrigation.  However, cause for gross hematuria remains indeterminant.  He is at high risk for an adverse outcome, so ongoing hospitalization to expedite diagnostic evaluation and initiate an appropriate treatment plan is considered medically necessary.  Based on the presently available information, hospitalization for at least 2 midnights is anticipated.    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 Ta of urology consulted with plan for urologic procedure today  Previous chronic Xarelto anticoagulation has been discontinued, discussed not restarting Xarelto after hospital discharge until follow-up with his cardiologist to determine whether ongoing Xarelto therapy is recommended because he had ablation treatment of atrial fibrillation about a year ago and is not known to have had any recurrent atrial fibrillation since that time and now has had significant bleeding  event  Follow renal function and urine output closely once able to measure urine output more reliably  Holding chronic Toprol-XL for now  Follow serial hemoglobin and consider additional blood transfusion for treatment of acute blood loss anemia if indicated  Continue symptomatic treatment including IV narcotics as needed for severe pain    DVT Prophylaxis: Pneumatic Compression Devices  Code Status: Full Code  Expected discharge: 2 - 3 days, recommended to prior living arrangement once Diagnostic evaluation has been completed with urologic treatment plan that is been formulated, he has improved clinically, and renal function is trending toward improvement.    Sinan Cabral MD    Primary Care Physician   [facility]    Chief Complaint   Blood in urine    History is obtained from the patient and electronic health record    History of Present Illness   Marilou Saldivar is a 65 year old male who presents with blood in urine.  He underwent cystoscopy with removal of bladder tumor on July 23.  Prior to his procedure, chronic Xarelto had been held for 5 days.  He did not notice any blood in his urine on July 23 or 24 after the procedure.  He restarted chronic Xarelto on July 24 in the evening which is when he normally takes it.  On July 25, he started to notice pink discoloration to his urine early in the day and this rapidly progressed to red blood in his urine.  Over the next day, he also had more difficulty voiding and at one point was not able to void despite urged to do so.  He then developed severe suprapubic pain, the worst pain he says he is ever had.  He presented to the emergency room and was hospitalized yesterday for observation.  A bladder irrigation catheter was placed at the recommendation of urology and continuous bladder irrigation was started.  Chronic Xarelto was discontinued.  He has continued to have grossly bloody urine.  His suprapubic pain resolved after bladder irrigation was  started.  He had one episode of obstruction of his irrigation catheter which responded to flushing.  He is now seen in his hospital room for evaluation.    Past Medical History    I have reviewed this patient's medical history and updated it with pertinent information if needed.   Past Medical History:   Diagnosis Date     Atrial fibrillation (H)      He says he underwent ablation treatment of atrial fibrillation about a year ago.  He is followed by cardiology Dr. Aponte through [facility].  Patient says that his cardiologist has advised that the patient continue chronic Xarelto therapy for prophylaxis against thromboembolism.  However, since his ablation procedure, the patient is not aware that he has had any recurring episodes of atrial fibrillation.  He has not had symptoms to suggest such.  His EKG on July 23 demonstrated sinus rhythm.    He developed gross hematuria this year and had CT urogram demonstrating findings concerning for bladder mass.  He underwent diagnostic and therapeutic cystoscopy on July 23 at which time his bladder tumor was removed.  Pathology of the tumor is pending at this time, but cancer is suspected.    Past Surgical History   I have reviewed this patient's surgical history and updated it with pertinent information if needed.  Past Surgical History:   Procedure Laterality Date     CYSTOSCOPY, TRANSURETHRAL RESECTION (TUR) TUMOR BLADDER INSTILL CHEMOTHERAPY, COMBINED N/A 7/23/2020    Procedure: CYSTOSCOPY, WITH TRANSURETHRAL RESECTION OF BLADDER TUMOR AND INSTILLATION OF CHEMOTHERAPEUTIC AGENT INTO BLADDER;  Surgeon: Bret Ta MD;  Location: [facility] OR     EP ICD      ablation       Prior to Admission Medications   Prior to Admission Medications   Prescriptions Last Dose Informant Patient Reported? Taking?   HYDROcodone-acetaminophen (NORCO) 5-325 MG tablet Past Week at Unknown time  No Yes   Sig: Take 1-2 tablets by mouth every 4 hours as needed for moderate to severe pain    Metoprolol Succinate 25 MG  at 10046  Yes Yes   Sig: TAKE 1 TABLET BY MOUTH ONCE DAILY HOLD IF PULSE IS LESS THEN 60 BPM   amLODIPine (NORVASC) 2.5 MG tablet 2020 at 0800  No Yes   Sig: Take 1 tablet (2.5 mg) by mouth daily   ciprofloxacin (CIPRO) 500 MG tablet 2020 at Unknown time  No Yes   Sig: Take 1 tablet (500 mg) by mouth 2 times daily for 3 days   rOPINIRole (REQUIP) 0.5 MG tablet Past Week at Unknown time  Yes Yes   Si.5 mg   rivaroxaban ANTICOAGULANT (XARELTO) 20 MG TABS tablet 2020 at Unknown time  Yes Yes   Sig: Take 20 mg by mouth      Facility-Administered Medications: None     Allergies   No Known Allergies    Social History   I have personally reviewed the social history with the patient showing   Social History     Tobacco Use     Smoking status: Never Smoker     Smokeless tobacco: Never Used   Substance Use Topics     Alcohol use: Never     Frequency: Never   .    Family History   I have reviewed this patient's family history and updated it with pertinent information if needed.   Family History   Problem Relation Age of Onset     Heart Disease Mother         heart bypass     Jc's disease Mother      Heart Disease Father      No Known Problems Brother      Unknown/Adopted Sister      No Known Problems Son      No Known Problems Daughter      No Known Problems Brother      Heart Disease Brother          of MI at 48       Review of Systems   The 10 point Review of Systems is negative other than noted in the HPI or here.     Physical Exam   Temp: 98.8  F (37.1  C) Temp src: Oral BP: (!) 161/80 Pulse: 82 Heart Rate: 72 Resp: 16 SpO2: 99 % O2 Device: None (Room air)    Patient Vitals for the past 24 hrs:   BP Temp Temp src Pulse Heart Rate Resp SpO2   20 1737 (!) 161/80 -- Oral 82 -- 16 --   20 1730 -- -- -- -- -- -- 99 %   20 1544 (!) 158/78 98.8  F (37.1  C) Oral 72 72 16 97 %   20 1118 118/64 98.5  F (36.9  C) Oral 74 -- 16 98 %    07/27/20 0727 (!) 151/76 98.5  F (36.9  C) Oral 67 -- 16 99 %   07/27/20 0539 (!) 154/76 -- -- 71 -- -- --   07/27/20 0431 (!) 150/73 -- -- -- -- -- --   07/27/20 0417 (!) 184/78 98.7  F (37.1  C) Oral 83 -- -- --   07/27/20 0014 121/62 98.2  F (36.8  C) Oral 67 -- 16 99 %   07/26/20 2325 115/54 97.6  F (36.4  C) Oral -- 65 16 98 %   07/26/20 2225 120/54 98.2  F (36.8  C) Oral 71 -- 16 97 %   07/26/20 2125 121/58 98.8  F (37.1  C) Oral 73 -- 18 97 %   07/26/20 2053 126/60 98.3  F (36.8  C) Oral 77 -- 16 99 %   07/26/20 2010 123/62 -- -- 80 -- -- 99 %   07/26/20 1945 97/44 98.1  F (36.7  C) Oral 80 -- 16 97 %     Constitutional: Middle-aged man presently appears comfortable while lying in bed  Eyes: Anicteric sclerae, clear conjunctivae  HEENT: Clear ear canals and nares, normal oropharynx  Neck: Trachea midline, symmetric, supple, nontender  Chest: No gross anomalies, symmetric excursion  Back: Normal to inspection, no CVA tenderness  Respiratory: Normal respiratory effort, clear lungs  Cardiovascular: Regular rate and rhythm, no murmur or gallop, good radial pulses, brisk capillary refill  GI: Nondistended abdomen, normal bowel sounds, soft, nontender  Lymph/Hematologic: No cervical lymphadenopathy  Genitourinary: Bladder irrigation catheter present with red blood throughout the catheter as well as in the collection bag  Skin: No rashes, pale color  Musculoskeletal: No cords or pitting edema in the lower extremities  Neurologic: Alert and oriented, no confusion, no focal deficits  Psychiatric: Normal mood and affect for the circumstances    Data   Data reviewed today:  I personally reviewed the EKG tracing showing Normal sinus rhythm on July 23.  Recent Labs   Lab 07/27/20 1832 07/27/20  0555 07/27/20  0430 07/27/20  0018  07/26/20  0332   WBC  --   --   --   --   --  8.7   HGB 7.1*  --  7.6* 6.8*   < > 8.1*   MCV  --   --   --   --   --  93   PLT  --   --   --   --   --  201   INR  --   --   --   --   --  1.53*    NA  --  147*  --   --   --  142   POTASSIUM  --  4.6  --   --   --  4.5   CHLORIDE  --  116*  --   --   --  109   CO2  --  27  --   --   --  25   BUN  --  26  --   --   --  27   CR  --  1.78*  --   --   --  1.71*   ANIONGAP  --  4  --   --   --  8   DEBBY  --  7.9*  --   --   --  7.9*   GLC  --  88  --   --   --  140*    < > = values in this interval not displayed.

## 2020-07-27 NOTE — ANESTHESIA PREPROCEDURE EVALUATION
Anesthesia Pre-Procedure Evaluation    Patient: Marilou Saldivar   MRN: [id number] : 1955          Preoperative Diagnosis: Gross hematuria [R31.0]    Procedure(s):  CYSTOSCOPY with fulguration    Past Medical History:   Diagnosis Date     Atrial fibrillation (H)      Past Surgical History:   Procedure Laterality Date     CYSTOSCOPY, TRANSURETHRAL RESECTION (TUR) TUMOR BLADDER INSTILL CHEMOTHERAPY, COMBINED N/A 2020    Procedure: CYSTOSCOPY, WITH TRANSURETHRAL RESECTION OF BLADDER TUMOR AND INSTILLATION OF CHEMOTHERAPEUTIC AGENT INTO BLADDER;  Surgeon: Bret Ta MD;  Location: [facility]     EP ICD      ablation       Anesthesia Evaluation     . Pt has had prior anesthetic. Type: MAC           ROS/MED HX    ENT/Pulmonary:  - neg pulmonary ROS     Neurologic:  - neg neurologic ROS     Cardiovascular:     (+) hypertension----. Taking blood thinners : . . . :. dysrhythmias a-fib, .       METS/Exercise Tolerance:     Hematologic:  - neg hematologic  ROS       Musculoskeletal:  - neg musculoskeletal ROS       GI/Hepatic:  - neg GI/hepatic ROS       Renal/Genitourinary:  - ROS Renal section negative       Endo:  - neg endo ROS       Psychiatric:  - neg psychiatric ROS       Infectious Disease:  - neg infectious disease ROS       Malignancy:      - no malignancy   Other:    - neg other ROS                      Physical Exam  Normal systems: cardiovascular, pulmonary and dental    Airway   Mallampati: II  TM distance: >3 FB  Neck ROM: full    Dental     Cardiovascular       Pulmonary             Lab Results   Component Value Date    WBC 8.7 2020    HGB 7.6 (L) 2020    HCT 23.7 (L) 2020     2020     (H) 2020    POTASSIUM 4.6 2020    CHLORIDE 116 (H) 2020    CO2 27 2020    BUN 26 2020    CR 1.78 (H) 2020    GLC 88 2020    DEBBY 7.9 (L) 2020    ALBUMIN 3.8 2020    PROTTOTAL 7.4 2020    ALT 30 2020    AST 20  "06/17/2020    ALKPHOS 104 06/17/2020    BILITOTAL 0.5 06/17/2020    PTT 30 07/26/2020    INR 1.53 (H) 07/26/2020       Preop Vitals  BP Readings from Last 3 Encounters:   07/27/20 118/64   07/23/20 (!) 153/86   07/14/20 118/76    Pulse Readings from Last 3 Encounters:   07/27/20 74   07/23/20 (!) 37   07/14/20 74      Resp Readings from Last 3 Encounters:   07/27/20 16   07/23/20 16   07/14/20 14    SpO2 Readings from Last 3 Encounters:   07/27/20 98%   07/23/20 99%   07/14/20 100%      Temp Readings from Last 1 Encounters:   07/27/20 98.5  F (36.9  C) (Oral)    Ht Readings from Last 1 Encounters:   07/23/20 1.702 m (5' 7\")      Wt Readings from Last 1 Encounters:   07/23/20 69 kg (152 lb 3.2 oz)    Estimated body mass index is 23.84 kg/m  as calculated from the following:    Height as of 7/23/20: 1.702 m (5' 7\").    Weight as of 7/23/20: 69 kg (152 lb 3.2 oz).       Anesthesia Plan      History & Physical Review  History and physical reviewed and following examination; no interval change.    ASA Status:  2 .    NPO Status:  > 8 hours    Plan for MAC with Intravenous and Propofol induction. Maintenance will be TIVA.  Reason for MAC:  Deep or markedly invasive procedure (G8)      The patient is not a current smoker , Patient not instructed to abstain from smoking on day of procedure and patient did not smoke on day of surgery     Postoperative Care  Postoperative pain management:  IV analgesics.      Consents  Anesthetic plan, risks, benefits and alternatives discussed with:  Patient.  Use of blood products discussed: No .   .                 MATEO Bermudez CRNA  "

## 2020-07-27 NOTE — CONSULTS
S: Patient is a 65-year-old  male who was seen for consultation with regard to patient's gross hematuria.  Patient underwent a transurethral resection of bladder cancer several days earlier that was unremarkable.  Catheter was removed postop day 1.  He started on Xarelto and soon afterward noticed gross hematuria.  He was admitted for observation and continuous bladder irrigation.  However his hematuria did not seem to resolve with patient requiring a blood transfusion x1.  He is doing well without any complaints.  Most recent path report showed transitional cell cancer of the bladder high-grade with invasion into the lamina propria without any evidence of muscle seen in no CVA tenderness.  The biopsy tissue.  No current outpatient medications on file.     No Known Allergies  Past Medical History:   Diagnosis Date     Atrial fibrillation (H)      Past Surgical History:   Procedure Laterality Date     CYSTOSCOPY, TRANSURETHRAL RESECTION (TUR) TUMOR BLADDER INSTILL CHEMOTHERAPY, COMBINED N/A 2020    Procedure: CYSTOSCOPY, WITH TRANSURETHRAL RESECTION OF BLADDER TUMOR AND INSTILLATION OF CHEMOTHERAPEUTIC AGENT INTO BLADDER;  Surgeon: Bret Ta MD;  Location: [facility]     EP ICD      ablation      Family History   Problem Relation Age of Onset     Heart Disease Mother         heart bypass     Laurel's disease Mother      Heart Disease Father      No Known Problems Brother      Unknown/Adopted Sister      No Known Problems Son      No Known Problems Daughter      No Known Problems Brother      Heart Disease Brother          of MI at 48     Social History     Socioeconomic History     Marital status:      Spouse name: None     Number of children: None     Years of education: None     Highest education level: None   Occupational History     None   Social Needs     Financial resource strain: None     Food insecurity     Worry: None     Inability: None     Transportation needs     Medical:  None     Non-medical: None   Tobacco Use     Smoking status: Never Smoker     Smokeless tobacco: Never Used   Substance and Sexual Activity     Alcohol use: Never     Frequency: Never     Drug use: Never     Sexual activity: Yes     Partners: Female   Lifestyle     Physical activity     Days per week: None     Minutes per session: None     Stress: None   Relationships     Social connections     Talks on phone: None     Gets together: None     Attends [employer] service: None     Active member of club or organization: None     Attends meetings of clubs or organizations: None     Relationship status: None     Intimate partner violence     Fear of current or ex partner: None     Emotionally abused: None     Physically abused: None     Forced sexual activity: None   Other Topics Concern     None   Social History Narrative     None       REVIEW OF SYSTEMS  =================  C: NEGATIVE for fever, chills, change in weight  I: NEGATIVE for worrisome rashes, moles or lesions  E/M: NEGATIVE for ear, mouth and throat problems  R: NEGATIVE for significant cough or SHORTNESS OF BREATH  CV:  NEGATIVE for chest pain, palpitations or peripheral edema  GI: NEGATIVE for nausea, abdominal pain, heartburn, or change in bowel habits  NEURO: NEGATIVE numbness/weakness  : see HPI  PSYCH: NEGATIVE depression/anxiety  LYmph: no new enlarged lymph nodes  Ortho: no new trauma/movements      Physical Exam:  BP (!) 158/78 (BP Location: Left arm)   Pulse 72   Temp 98.8  F (37.1  C) (Oral)   Resp 16   SpO2 97%    Patient is pleasant, in no acute distress, good general condition.  Heart:  negative, PMI normal  Lung: no evidence of respiratory distress    Abdomen: Soft, nondistended, non tender. No masses. No rebound or guarding.   Exam: Irrigation with light cherry colored urine.  No CVA tenderness.  Skin: Warm and dry.  No redness.  Neuro: grossly normal  Musculaskeletal: moving all extremities  Psych normal mood and  affect  Musculoskeletal  moving all extremities  Hematologic/Lymphatic/Immunologic: normal ant/post cervical, axillary, supraclavicular and inguinal nodes  Copath Report  Patient Name: BEATRIS ARCHER   MR#: [id number]   Specimen #: [id number]   Collected: 7/23/2020   Received: 7/24/2020   Reported: 7/27/2020 14:39   Ordering Phy(s): LUIS CRUZ     For improved result formatting, select 'View Enhanced Report Format' under    Linked Documents section.     SPECIMEN(S):   Bladder tumor     FINAL DIAGNOSIS:   Bladder, not otherwise specified, biopsy   - Papillary transitional cell carcinoma, high grade with lamina propria   invasion, muscularis propria not   identified     Electronically signed out by:     Nomi Maurer M.D.     CLINICAL HISTORY:   Malignant neoplasm of posterior wall of urinary bladder        Assessment/Plan:   5-year-old male with gross hematuria status post trans urethral resection of the bladder cancer.  Since his hematuria did not seem to resolve with conservative therapy will schedule patient for cystoscopy with bleeding fulguration and repeat biopsy due to recent path report that indicated lack of muscle in the recent biopsy specimen.  He should hold Xarelto until bleeding completely stops after procedure.

## 2020-07-27 NOTE — PLAN OF CARE
S-(situation): shift note    B-(background): Hematuria    A-(assessment): Pt is A%O.  VSS.  Afebrile.  Is bedrest at this time.  Has CBI going.  Rate is a very fast drip.  If turned down, clots will form  He denies pain at this time.  Output is red in colored.      R-(recommendations): Will cont to monitor - will have cystoscopy later this afternoon.

## 2020-07-27 NOTE — BRIEF OP NOTE
[facility] Brief Operative Note    Pre-operative diagnosis: Gross hematuria [R31.0]   Post-operative diagnosis Blood clots retention   Procedure: Procedure(s):  CYSTOSCOPY with fulguration of Hemorrhaging blood vessel and Thrombus Removal   Surgeon(s): Surgeon(s) and Role:     * Bret Ta MD - Primary   Estimated blood loss: * No values recorded between 7/27/2020  5:56 PM and 7/27/2020  6:56 PM *    Specimens: ID Type Source Tests Collected by Time Destination   A : Base of bladder cancer Tissue Bladder SURGICAL PATHOLOGY EXAM Bret Ta MD 7/27/2020  6:44 PM       Findings: Large blood clots/no active bleeding

## 2020-07-27 NOTE — PROVIDER NOTIFICATION
Notified MD at 0040 AM regarding lab results.      Spoke with: Dr Vasquez    Orders were not obtained.    Comments: patient had a HGB of 6.0 received one unit packed blood cells. Post transfusion HGB 6.8. Dr Vasquez made aware. Vitals stable and patient denied any complaints of dizziness. CBI continues with red urinary returns. Will have HGB rechecked at 0400 and monitor patient . If he developes symptoms will notify MD.

## 2020-07-27 NOTE — UTILIZATION REVIEW
Admission Status; Secondary Review Determination     Admission Date: 7/26/2020  1:53 AM       Under the authority of the Utilization Management Committee, the utilization review process indicated a secondary review on the above patient.  The review outcome is based on review of the medical records, discussions with staff, and applying clinical experience noted on the date of the review.        (x)      Inpatient Status Appropriate - This patient's medical care is consistent with medical management for inpatient care and reasonable inpatient medical practice.       RATIONALE FOR DETERMINATION      Brief clinical presentation, information copied from the chart, abbreviated and edited for relevant content:       Marilou Saldivar is a 65 year old male admitted to [facility] yesterday due to gross hematuria after recent cystoscopy on July 23 with resection of bladder tumor and subsequent reinitiation of chronic anticoagulation with Xarelto yesterday.  He continues to have gross hematuria after initiation of continuous bladder irrigation and has had intermittent clotting causing obstruction of his bladder catheter with increased pain requiring IV narcotics.  Worsening anemia is evident which could be due to blood losses but also hemodilution.Active Problems:  PMH of Malignant neoplasm of posterior wall of urinary bladder (H)-s/p cysto 7/23/20l, Chronic anticoagulation-Xarelto, Anemia due to blood loss, acute, PAF (paroxysmal atrial fibrillation), HTN (hypertension), S/P ablation of atrial fibrillation. Plan now to Continue bladder irrigation in accordance with recommendations from urology  Holding chronic prophylactic Xarelto anticoagulation for atrial fibrillation because risk of bleeding appears to outweigh benefit at this time and he has not had any known recent thromboembolic events, Continue IV fluids, and Continue to monitor serial hemoglobin, consider blood transfusion for hemoglobin less than 7 or if his hemodynamic status  deteriorates. Paged Dr. Cabral to consider advancing to IP at this time.      At this time, it will more than 2 nights hospital complex care was anticipated. Also, there was a risk of adverse outcome if patient was treated outpatient or observation. High intensity of services anticipated. Inpatient admission appropriate based on Medicare guidelines.       The information on this document is developed by the utilization review team in order for the business office to ensure compliance.  This only denotes the appropriateness of proper admission status and does not reflect the quality of care rendered.         The definitions of Inpatient Status and Observation Status used in making the determination above are those provided in the CMS Coverage Manual, Chapter 1 and Chapter 6, section 70.4.      Sincerely,      Meera Mirza MD   Utilization Review/ Case Management  [facility].

## 2020-07-28 LAB
ANION GAP SERPL CALCULATED.3IONS-SCNC: 3 MMOL/L (ref 3–14)
BUN SERPL-MCNC: 25 MG/DL (ref 7–30)
CALCIUM SERPL-MCNC: 8.4 MG/DL (ref 8.5–10.1)
CHLORIDE SERPL-SCNC: 112 MMOL/L (ref 94–109)
CO2 SERPL-SCNC: 29 MMOL/L (ref 20–32)
CREAT SERPL-MCNC: 1.59 MG/DL (ref 0.66–1.25)
GFR SERPL CREATININE-BSD FRML MDRD: 44 ML/MIN/{1.73_M2}
GLUCOSE SERPL-MCNC: 133 MG/DL (ref 70–99)
HGB BLD-MCNC: 7.5 G/DL (ref 13.3–17.7)
POTASSIUM SERPL-SCNC: 4.4 MMOL/L (ref 3.4–5.3)
SODIUM SERPL-SCNC: 144 MMOL/L (ref 133–144)

## 2020-07-28 PROCEDURE — 99233 SBSQ HOSP IP/OBS HIGH 50: CPT | Performed by: INTERNAL MEDICINE

## 2020-07-28 PROCEDURE — 36415 COLL VENOUS BLD VENIPUNCTURE: CPT | Performed by: UROLOGY

## 2020-07-28 PROCEDURE — 80048 BASIC METABOLIC PNL TOTAL CA: CPT | Performed by: UROLOGY

## 2020-07-28 PROCEDURE — 85018 HEMOGLOBIN: CPT | Performed by: UROLOGY

## 2020-07-28 PROCEDURE — 25800030 ZZH RX IP 258 OP 636: Performed by: NURSE ANESTHETIST, CERTIFIED REGISTERED

## 2020-07-28 PROCEDURE — 12000000 ZZH R&B MED SURG/OB

## 2020-07-28 RX ADMIN — SODIUM CHLORIDE, POTASSIUM CHLORIDE, SODIUM LACTATE AND CALCIUM CHLORIDE: 600; 310; 30; 20 INJECTION, SOLUTION INTRAVENOUS at 13:48

## 2020-07-28 RX ADMIN — SODIUM CHLORIDE, POTASSIUM CHLORIDE, SODIUM LACTATE AND CALCIUM CHLORIDE: 600; 310; 30; 20 INJECTION, SOLUTION INTRAVENOUS at 23:14

## 2020-07-28 ASSESSMENT — ACTIVITIES OF DAILY LIVING (ADL)
ADLS_ACUITY_SCORE: 13

## 2020-07-28 NOTE — PLAN OF CARE
VSS. A/Ox4. Denies pain. Heart rate regular, lung sounds clear, bowel sounds active. Voiding clear pale yellow urine, no clots. Post residual bladder scans, see flowsheet for this. Up independently in room. Walked in the halls today. IV infusing per orders. Will continue to monitor.

## 2020-07-28 NOTE — ANESTHESIA CARE TRANSFER NOTE
Patient: Marilou Saldivar    Procedure(s):  CYSTOSCOPY with  Thrombus Removal  Cystoscopy, biopsy bladder, combined    Diagnosis: Gross hematuria [R31.0]  Diagnosis Additional Information: No value filed.    Anesthesia Type:   MAC     Note:  Airway :Room Air  Patient transferred to:Medical/Surgical Unit  Handoff Report: Identifed the Patient, Identified the Reponsible Provider, Reviewed the pertinent medical history, Discussed the surgical course, Reviewed Intra-OP anesthesia mangement and issues during anesthesia, Set expectations for post-procedure period and Allowed opportunity for questions and acknowledgement of understanding      Vitals: (Last set prior to Anesthesia Care Transfer)    CRNA VITALS  7/27/2020 1833 - 7/27/2020 1925      7/27/2020             SpO2:  100 %    Resp Rate (observed):  (!) 5                Electronically Signed By: MATEO Pool CRNA  July 27, 2020  7:25 PM

## 2020-07-28 NOTE — ANESTHESIA POSTPROCEDURE EVALUATION
Patient: Marilou aSldivar    Procedure(s):  CYSTOSCOPY with  Thrombus Removal  Cystoscopy, biopsy bladder, combined    Diagnosis:Gross hematuria [R31.0]  Diagnosis Additional Information: No value filed.    Anesthesia Type:  MAC    Note:  Anesthesia Post Evaluation    Patient location during evaluation: Bedside  Patient participation: Able to fully participate in evaluation  Level of consciousness: awake and alert  Pain management: adequate  Airway patency: patent  Cardiovascular status: acceptable and stable  Respiratory status: acceptable and room air  Hydration status: acceptable  PONV: none     Anesthetic complications: None    Comments:  Patient was happy with the anesthesia care received and no anesthesia related complications were noted.  I will follow up with the patient again if it is needed.        Last vitals:  Vitals:    07/27/20 2225 07/28/20 0228 07/28/20 0625   BP: (!) 150/77 109/50 135/68   Pulse: 74 65 74   Resp: 16 17 18   Temp: 98.2  F (36.8  C) 98.9  F (37.2  C) 98.5  F (36.9  C)   SpO2: 95% 96% 95%         Electronically Signed By: MATEO Dolan CRNA  July 28, 2020  9:12 AM

## 2020-07-28 NOTE — PLAN OF CARE
VSS. Afebrile. Pt A&Ox4. Denies any pain. Pt went down at 1730 for a cystoscopy, No active bleeding found and bladder was irrigated. Pt came up with continuous irrigation of bladder in place. Output is light clear. LR running in IV at 100ml/hr. Skin intact. Active bowel sounds. No BM this shift. Diet returned to regular and tolerating well. Writer gave patients wife an update. No complaints at this time. Will continue to monitor.

## 2020-07-28 NOTE — PROGRESS NOTES
[facility]    Medicine Progress Note - Hospitalist Service       Date of Admission:  7/26/2020  Assessment & Plan   Marilou Saldivar is a 65 year old male who presents with persistent gross hematuria after urologic procedure on July 23.  He has had associated acute blood loss anemia with relative hypotension and appears to have acute kidney injury with known single kidney at baseline.  Presentation was concerning for acute urinary obstruction from blood clots and obstructive uropathy     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 (Bladder, not otherwise specified, biopsy   - Papillary transitional cell carcinoma, high grade with lamina propria   invasion, muscularis propria not identified)     Chronic anticoagulation-Xarelto    Anemia due to blood loss, acute-transfused 1u PRBC 7/26    Acute kidney injury (H)    Acute urinary obstruction    PAF (paroxysmal atrial fibrillation) (H)    HTN (hypertension)    S/P ablation of atrial fibrillation    Appreciate urology review.  Status post cystoscopy and bladder irrigation 7/27  -Postprocedure hemoglobins have been stable.  Recheck tomorrow morning  -Continue to monitor for any urinary retention post catheter removal.  Patient is very concerned for having recurrence of obstructions will monitor another night.  -Continue to hold Previous chronic Xarelto anticoagulation (Dr. Cabral discussed not restarting Xarelto after hospital discharge until follow-up with his cardiologist to determine whether ongoing Xarelto therapy is recommended because he had ablation treatment of atrial fibrillation about a year ago and is not known to have had any recurrent atrial fibrillation since that time and now has had significant bleeding event)    Holding chronic Toprol-XL for now  Follow serial hemoglobin and consider additional blood transfusion for treatment of acute  blood loss anemia if indicated  Continue symptomatic treatment including IV narcotics as needed for severe pain    -Await further discussion by urologist with the patient regarding new diagnosis of bladder malignancy.    J   Diet: Regular Diet Adult    DVT Prophylaxis: Pneumatic Compression Devices  G[place]ora Catheter: not present  Code Status: Full Code           Disposition Plan   Expected discharge: Tomorrow, recommended to prior living arrangement once Hb stable and Cr improves.  Entered: Michele West MD, MD 07/28/2020, 3:21 PM       The patient's care was discussed with the Bedside Nurse, Care Coordinator/ and Patient.    Michele West MD, MD  Hospitalist Service  [facility]    ______________________________________________________________________    Interval History   History revisited.  Status post cystoscopy and bladder irrigation last night.  Catheter was removed at 2 AM and has been able to urinate since then.  Denies any fever/nausea/vomiting  4 point ROS done and negative unless mentioned.      Data reviewed today: I reviewed all medications, new labs and imaging results over the last 24 hours. I personally reviewed no images or EKG's today.    Physical Exam   /68 (BP Location: Right arm)   Pulse 74   Temp 98.5  F (36.9  C) (Oral)   Resp 18   SpO2 95%   Gen- pleasant laying in bed  HEENT- NAD, NENITA, MMM  Neck- supple, no JVD elevation, no thyromegaly  CVS- I+II+ no m/r/g  RS- CTAB  Abdo- soft, no tenderness . No g/r/r   Ext- no edema   CNS- no focal signs       Data   BMP  Recent Labs   Lab 07/28/20  0538 07/27/20  0555 07/26/20  0332    147* 142   POTASSIUM 4.4 4.6 4.5   CHLORIDE 112* 116* 109   DEBBY 8.4* 7.9* 7.9*   CO2 29 27 25   BUN 25 26 27   CR 1.59* 1.78* 1.71*   * 88 140*     CBC  Recent Labs   Lab 07/28/20  0538  07/26/20  0421 07/26/20  0332   WBC  --   --   --  8.7   RBC  --   --   --  2.58*   HGB 7.5*   < > 8.0* 8.1*   HCT  --    --  23.7* 23.9*   MCV  --   --   --  93   MCH  --   --   --  31.4   MCHC  --   --   --  33.9   RDW  --   --   --  11.4   PLT  --   --   --  201    < > = values in this interval not displayed.     INR  Recent Labs   Lab 07/26/20  0332   INR 1.53*

## 2020-07-28 NOTE — PROGRESS NOTES
S-(situation): Patient changed to inpatient status    B-(background): Patient status change due to observation goals not being met.     A-(assessment): Vital signs:  Temp: 98.8  F (37.1  C) Temp src: Oral BP: (!) 158/74 Pulse: 69 Heart Rate: 69 Resp: 16 SpO2: 93 % O2 Device: None (Room air)        A&Ox4. Denies pain. Continous bladder irrigation still in place. Urine output was red without clots now urine is clear yellow in color. Lungs are clear. Skin intact. IV running LR at 75ml/hr. Will continue to monitor.       R-(recommendations): Will monitor patient per MD orders.       Inpatient nursing criteria listed below were met:    Adult Profile completedYes  Health care directives status obtained and documented: Yes  VTE prophylaxis orders: Xarelto  (FYI: Asprin is not an approved anticoagulation for DVT prophylaxis)  SCD's Documented: No  Vaccine assessment done and vaccine ordered if needed. Not Applicable  My Chart patient sign up addressed and documented: No  Care Plan initiated: Yes  Discharge planning review completed (admission navigator) Yes

## 2020-07-28 NOTE — PLAN OF CARE
Urine had been clear pale yellow after procedure last evening. No clots. CBI and Naidu was discontinued at 0200. Pt denies any pain. He has not voided since naidu was removed but doesn't feel the urge yet. Vitals have been stable.

## 2020-07-29 VITALS
TEMPERATURE: 98.3 F | OXYGEN SATURATION: 98 % | DIASTOLIC BLOOD PRESSURE: 67 MMHG | HEART RATE: 78 BPM | SYSTOLIC BLOOD PRESSURE: 126 MMHG | RESPIRATION RATE: 16 BRPM

## 2020-07-29 LAB
ANION GAP SERPL CALCULATED.3IONS-SCNC: 1 MMOL/L (ref 3–14)
BUN SERPL-MCNC: 35 MG/DL (ref 7–30)
CALCIUM SERPL-MCNC: 8.4 MG/DL (ref 8.5–10.1)
CHLORIDE SERPL-SCNC: 113 MMOL/L (ref 94–109)
CO2 SERPL-SCNC: 32 MMOL/L (ref 20–32)
CREAT SERPL-MCNC: 1.65 MG/DL (ref 0.66–1.25)
ERYTHROCYTE [DISTWIDTH] IN BLOOD BY AUTOMATED COUNT: 12.2 % (ref 10–15)
GFR SERPL CREATININE-BSD FRML MDRD: 42 ML/MIN/{1.73_M2}
GLUCOSE SERPL-MCNC: 90 MG/DL (ref 70–99)
HCT VFR BLD AUTO: 21.6 % (ref 40–53)
HGB BLD-MCNC: 7.4 G/DL (ref 13.3–17.7)
MCH RBC QN AUTO: 32.2 PG (ref 26.5–33)
MCHC RBC AUTO-ENTMCNC: 34.3 G/DL (ref 31.5–36.5)
MCV RBC AUTO: 94 FL (ref 78–100)
PLATELET # BLD AUTO: 185 10E9/L (ref 150–450)
POTASSIUM SERPL-SCNC: 4.2 MMOL/L (ref 3.4–5.3)
RBC # BLD AUTO: 2.3 10E12/L (ref 4.4–5.9)
SODIUM SERPL-SCNC: 146 MMOL/L (ref 133–144)
WBC # BLD AUTO: 10.9 10E9/L (ref 4–11)

## 2020-07-29 PROCEDURE — 99239 HOSP IP/OBS DSCHRG MGMT >30: CPT | Performed by: INTERNAL MEDICINE

## 2020-07-29 PROCEDURE — 85027 COMPLETE CBC AUTOMATED: CPT | Performed by: INTERNAL MEDICINE

## 2020-07-29 PROCEDURE — 25000132 ZZH RX MED GY IP 250 OP 250 PS 637: Performed by: INTERNAL MEDICINE

## 2020-07-29 PROCEDURE — 36415 COLL VENOUS BLD VENIPUNCTURE: CPT | Performed by: INTERNAL MEDICINE

## 2020-07-29 PROCEDURE — 80048 BASIC METABOLIC PNL TOTAL CA: CPT | Performed by: INTERNAL MEDICINE

## 2020-07-29 RX ORDER — METOPROLOL SUCCINATE 25 MG/1
25 TABLET, EXTENDED RELEASE ORAL DAILY
Status: DISCONTINUED | OUTPATIENT
Start: 2020-07-29 | End: 2020-07-29 | Stop reason: HOSPADM

## 2020-07-29 RX ORDER — TAMSULOSIN HYDROCHLORIDE 0.4 MG/1
0.4 CAPSULE ORAL DAILY
Status: DISCONTINUED | OUTPATIENT
Start: 2020-07-29 | End: 2020-07-29 | Stop reason: HOSPADM

## 2020-07-29 RX ORDER — TAMSULOSIN HYDROCHLORIDE 0.4 MG/1
0.4 CAPSULE ORAL DAILY
Qty: 30 CAPSULE | Refills: 1 | Status: SHIPPED | OUTPATIENT
Start: 2020-07-30 | End: 2023-11-01

## 2020-07-29 RX ADMIN — TAMSULOSIN HYDROCHLORIDE 0.4 MG: 0.4 CAPSULE ORAL at 10:46

## 2020-07-29 RX ADMIN — METOPROLOL SUCCINATE 25 MG: 25 TABLET, FILM COATED, EXTENDED RELEASE ORAL at 12:17

## 2020-07-29 ASSESSMENT — ACTIVITIES OF DAILY LIVING (ADL)
ADLS_ACUITY_SCORE: 13

## 2020-07-29 NOTE — PLAN OF CARE
4 Hour shift note:    Patient has denied any pain. Vitals have been stable. Pt has not voided this evening but doesn't feel the urge to. Last PVR around 1830 was 200 per previous nurse report. Pt reports having clear urine today.

## 2020-07-29 NOTE — DISCHARGE SUMMARY
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)-s/p cysto 7/23/20 (Bladder, not otherwise specified, biopsy   - Papillary transitional cell carcinoma, high grade with lamina propria   invasion, muscularis propria not identified)     Anemia due to blood loss, acute-transfused 1u PRBC 7/26    Acute kidney injury (H)    Acute urinary obstruction    Chronic:     PAF (paroxysmal atrial fibrillation) (H)    HTN (hypertension)    S/P ablation of atrial fibrillation       Follow-ups Needed After Discharge   Follow-up Appointments     Follow-up and recommended labs and tests       Follow up with primary care provider, [facility], within 7   days for hospital follow- up.  The following labs/tests are recommended:   BMP.    Follow up with Dr. Ta/ Urology as planned , at (location with clinic   name or city)   for hospital follow- up. The following labs/tests are   recommended: BMP, Hb         {    Unresulted Labs Ordered in the Past 30 Days of this Admission     Date and Time Order Name Status Description    7/27/2020 1846 Surgical pathology exam In process       These results will be followed up by Urology    Discharge Disposition   Discharged to home  Condition at discharge: Stable      Hospital Course   Marilou Saldivar is a 65 year old male who presents with persistent gross hematuria after urologic procedure on July 23.  He has had associated acute blood loss anemia with relative hypotension and appears to have acute kidney injury with known single kidney at baseline.  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    -Continued to hold Previous chronic Xarelto anticoagulation (Dr. Cabral discussed not restarting Xarelto after hospital discharge until follow-up with his cardiologist to determine whether ongoing Xarelto therapy is recommended because he had ablation treatment of atrial fibrillation about a year ago and is not known to have had any recurrent atrial fibrillation since that time and now has had significant bleeding event)      Consultations This Hospital Stay   None    Code Status   Full Code    Time Spent on this Encounter   I, Michele West MD, MD, personally saw the patient today and spent greater than 30 minutes discharging this patient.       Michele West MD, MD  [facility]  ______________________________________________________________________    Physical Exam   Vital Signs: Temp: 98.3  F (36.8  C) Temp src: Oral BP: 126/67 Pulse: 78 Heart Rate: 69 Resp: 16 SpO2: 98 % O2 Device: None (Room air)    Weight: 0 lbs 0 oz  Gen- pleasant laying in bed  HEENT- NAD, NENITA, MMM  Neck- supple, no JVD elevation, no thyromegaly  CVS- I+II+ no m/r/g  RS- CTAB  Abdo- soft, no tenderness . No g/r/r   Ext- no edema   CNS- no focal signs   Primary Care Physician   [facility]    Discharge Orders      **Basic metabolic panel FUTURE 1yr     **Hemoglobin FUTURE 14d    Last Lab Result: Hemoglobin (g/dL)       Date                     Value                 07/29/2020               7.4 (L)          ----------     Reason for your hospital stay    Marilou Saldivar is a 65 year old male who presents with persistent gross hematuria after urologic procedure on July 23.  He has had associated acute blood loss anemia with relative hypotension and appears to have acute kidney injury with known single kidney at baseline.  Presentation was concerning for acute urinary obstruction from blood clots and obstructive uropathy  > he was reviewed by Urology and had cystoscopy and bladder irrigation 7/27  -Postprocedure hemoglobins were monitored and remained stable.     Follow-up and recommended labs and tests     Follow up with primary care provider, [facility], within 7 days for hospital follow- up.  The following labs/tests are recommended: BMP.    Follow up with Dr. Ta/ Urology as planned , at (location with clinic name or city)   for hospital follow- up. The following labs/tests are recommended: BMP, Hb     Activity    Your activity upon discharge: activity as tolerated     When to contact your care team    Call your primary doctor if you have any of the following: temperature greater than 100.4 or less than 96, increased pain or urinary retention.     Diet    Follow this diet upon discharge: Orders Placed This Encounter      Regular Diet Adult       Significant Results and Procedures   Results for orders placed or performed during the hospital encounter of 06/18/20   CT Urogram wo & w Contrast    Narrative    CT UROGRAM WITHOUT AND WITH CONTRAST   6/18/2020 3:55 PM    HISTORY: Hematuria, unknown cause. Gross hematuria.    TECHNIQUE: Scans obtained from the diaphragm through the pelvis  without and with IV contrast, 75 Isovue-370. Radiation dose for this  scan was reduced using  automated exposure control, adjustment of the  mA and/or kV according to patient size, or iterative reconstruction  technique.    COMPARISON: None.    FINDINGS: Layering density left posterior base likely represent  atelectasis and/or scarring. Scattered groundglass densities likely  represent atelectasis. COVID-19 is considered less likely. Visualized  portions of the lung bases and mediastinal contents are otherwise  grossly unremarkable.    No aggressive osseous lesions or acute osseous fracture seen. A few  tiny benign bone islands are seen in the bilateral ischial  tuberosities. There are degenerative changes in the spine. S-shaped  curvature of the thoracolumbar spine is noted.    There is a filling defect in the superior, posterior, right urinary  bladder (image 165 series 7, image 164 series 3, image 66 series 6,  and image 163 series 12. This measures up to approximately 1.3 cm.  This can be associated with her hematuria. Further evaluation with  cystoscopy is recommended. Urinary bladder is otherwise unremarkable.    Low-attenuation subcentimeter renal lesion(s). These are compatible  with small benign cysts and no specific imaging evaluation or  follow-up is recommended. Mild prominence of the right intrarenal  collecting system appears to represent very mild hydronephrosis,  although no obstructive process is seen in the kidneys or ureters.  Otherwise, the liver, gallbladder, bilateral adrenal glands, pancreas,  spleen, and right kidney enhance normally. No nephrolithiasis,  hydroureter or ureteral calculus is seen. Prostate gland is mildly  enlarged and indents the posterior inferior aspect of the urinary  bladder. Left kidney is not seen consistent with history of congenital  absence of the left kidney.    No adenopathy, free fluid, or free air is seen in the peritoneal  cavity. There is nonaneurysmal atherosclerosis.    Mild thickening of the wall of the sigmoid colon/rectum is likely due  to  incomplete distention. There are multiple diverticula in the  sigmoid colon. No pericolonic inflammatory change to suggest acute  diverticulitis. Appendix is not well seen. No pericecal inflammatory  change to suggest acute appendicitis. Hypoattenuating structure  adjacent to the cecum inferior to the terminal ileum could represent  dilation of the proximal appendix. Tubular structures extending  inferiorly from this hypoattenuating area could represent the  remaining portions of a normal appendix. Mucocele in the appendix is  not excluded. This area measures up to 1.6 x 2.9 cm (image 129 series  7).    There are mildly prominent fluid-filled loops in the small bowel  measuring up to 2.2 cm in diameter. These are grossly within normal  limits, but could represent mild ileus. Small bowel is otherwise  unremarkable. The stomach contains a moderate amount of fluid and  small amount of air, but is otherwise unremarkable.      Impression    IMPRESSION:  1. Enhancing intraluminal filling defect in the posterior right  superior urinary bladder could represent neoplasm and further  evaluation with cystoscopy is recommended. This could be the cause of  the patient's hematuria.  2. Colonic diverticulosis without evidence for acute diverticulitis.  3. Congenitally absent left kidney.  4. Mild nonaneurysmal atherosclerosis.  5. Possible mild small bowel ileus.    STACY SANTOS MD   ,   BMP  Recent Labs   Lab 07/29/20  0607 07/28/20  0538 07/27/20  0555 07/26/20  0332   * 144 147* 142   POTASSIUM 4.2 4.4 4.6 4.5   CHLORIDE 113* 112* 116* 109   DEBBY 8.4* 8.4* 7.9* 7.9*   CO2 32 29 27 25   BUN 35* 25 26 27   CR 1.65* 1.59* 1.78* 1.71*   GLC 90 133* 88 140*     CBC  Recent Labs   Lab 07/29/20  0607  07/26/20  0421 07/26/20  0332   WBC 10.9  --   --  8.7   RBC 2.30*  --   --  2.58*   HGB 7.4*   < > 8.0* 8.1*   HCT 21.6*  --  23.7* 23.9*   MCV 94  --   --  93   MCH 32.2  --   --  31.4   MCHC 34.3  --   --  33.9   RDW 12.2  --    --  11.4     --   --  201    < > = values in this interval not displayed.     INR  Recent Labs   Lab 07/26/20  0332   INR 1.53*       Discharge Medications   Current Discharge Medication List      START taking these medications    Details   tamsulosin (FLOMAX) 0.4 MG capsule Take 1 capsule (0.4 mg) by mouth daily  Qty: 30 capsule, Refills: 1    Associated Diagnoses: Gross hematuria; S/P TURP         CONTINUE these medications which have NOT CHANGED    Details   HYDROcodone-acetaminophen (NORCO) 5-325 MG tablet Take 1-2 tablets by mouth every 4 hours as needed for moderate to severe pain  Qty: 10 tablet, Refills: 0    Associated Diagnoses: Malignant neoplasm of posterior wall of urinary bladder (H)      Metoprolol Succinate 25 MG CS24 TAKE 1 TABLET BY MOUTH ONCE DAILY HOLD IF PULSE IS LESS THEN 60 BPM      rOPINIRole (REQUIP) 0.5 MG tablet Take 1 tablet daily 3 hours before bedtime         STOP taking these medications       amLODIPine (NORVASC) 2.5 MG tablet Comments:   Reason for Stopping:         ciprofloxacin (CIPRO) 500 MG tablet Comments:   Reason for Stopping:         rivaroxaban ANTICOAGULANT (XARELTO) 20 MG TABS tablet Comments:   Reason for Stopping:             Allergies   No Known Allergies

## 2020-07-29 NOTE — PLAN OF CARE
S-(situation): Patient discharged to home via wheelchair with wife    B-(background): Hematuria with cystocopy    A-(assessment): VSS, BP slightly elevated this morning, did get restarted on metoprolol. Afebrile. A/Ox4. Lung sounds clear. Denies any pain. Up independently in room. Still continues to retain post void, see MAR. Tolerating a regular diet with no nausea. Skin intact. Bowel sounds active. Did have a bowel movement this shift. IV infusing, asymptomatic. Able to make needs known. Discharge home with wife    R-(recommendations): Discharge instructions reviewed with patient and spouse. Listed belongings gathered and returned to patient.          Discharge Nursing Criteria:     Care Plan and Patient education resolved: Yes    New Medications- pt has been educated about purpose and side effects: Yes    MISC  Prescriptions if needed, hard copies sent with patient  Yes  Home and hospital aquired medications returned to patient: NA  Medication Bin checked and emptied on discharge Yes  Patient reports post-discharge pain management plan is effective: Yes

## 2020-07-29 NOTE — PLAN OF CARE
Vital signs stable.  Afebrile. Lung sounds Clear.  A&O x4.  Pt denies pain. Ambulating independently. Voided @ 0445.  Bladder scan 535.  Encourage pt to double void had another 300 out of pale yellow urine.  Bladder scan was 393.  Pt not uncomfortable.  Charge nurse updated.  Skin is intact. IV site asymptomatic running LR @ 100 ml/hr.  Able to make needs known and uses call light appropriately. Continue to monitor per care plan.

## 2020-07-30 ENCOUNTER — TELEPHONE (OUTPATIENT)
Dept: CARDIOLOGY | Facility: CLINIC | Age: 65
End: 2020-07-30

## 2020-07-30 ENCOUNTER — TELEPHONE (OUTPATIENT)
Dept: UROLOGY | Facility: CLINIC | Age: 65
End: 2020-07-30

## 2020-07-30 LAB — COPATH REPORT: NORMAL

## 2020-07-30 NOTE — TELEPHONE ENCOUNTER
Phone call to patient to do pre visit questions for today's cardiology appointment with Dr. Cornell. Patient stated he has a cardiologist at [facility] and would like to follow up with them rather than switch to [facility] at this time. Appointment canceled per patient request.

## 2020-07-30 NOTE — TELEPHONE ENCOUNTER
Reason for call:  Other   Patient called regarding (reason for call): call back  Additional comments: Patient is calling because he has questions about his prior surgery he just had and wants to discuss the cancer. Please call back     Phone number to reach patient:  Cell number on file:    Telephone Information:   Mobile [phone]       Best Time:  any    Can we leave a detailed message on this number?  YES    Travel screening: Not Applicable

## 2020-08-04 ENCOUNTER — OFFICE VISIT (OUTPATIENT)
Dept: FAMILY MEDICINE | Facility: CLINIC | Age: 65
End: 2020-08-04
Payer: COMMERCIAL

## 2020-08-04 VITALS
BODY MASS INDEX: 23.83 KG/M2 | WEIGHT: 152.13 LBS | TEMPERATURE: 97.6 F | SYSTOLIC BLOOD PRESSURE: 160 MMHG | OXYGEN SATURATION: 100 % | RESPIRATION RATE: 12 BRPM | HEART RATE: 81 BPM | DIASTOLIC BLOOD PRESSURE: 70 MMHG

## 2020-08-04 DIAGNOSIS — C67.4 MALIGNANT NEOPLASM OF POSTERIOR WALL OF URINARY BLADDER (H): ICD-10-CM

## 2020-08-04 DIAGNOSIS — D62 ANEMIA DUE TO BLOOD LOSS, ACUTE: Primary | ICD-10-CM

## 2020-08-04 DIAGNOSIS — N18.30 CKD (CHRONIC KIDNEY DISEASE) STAGE 3, GFR 30-59 ML/MIN (H): ICD-10-CM

## 2020-08-04 LAB
ANION GAP SERPL CALCULATED.3IONS-SCNC: 2 MMOL/L (ref 3–14)
BUN SERPL-MCNC: 20 MG/DL (ref 7–30)
CALCIUM SERPL-MCNC: 8.7 MG/DL (ref 8.5–10.1)
CHLORIDE SERPL-SCNC: 113 MMOL/L (ref 94–109)
CO2 SERPL-SCNC: 28 MMOL/L (ref 20–32)
CREAT SERPL-MCNC: 1.59 MG/DL (ref 0.66–1.25)
GFR SERPL CREATININE-BSD FRML MDRD: 44 ML/MIN/{1.73_M2}
GLUCOSE SERPL-MCNC: 89 MG/DL (ref 70–99)
HGB BLD-MCNC: 8.3 G/DL (ref 13.3–17.7)
POTASSIUM SERPL-SCNC: 4.2 MMOL/L (ref 3.4–5.3)
SODIUM SERPL-SCNC: 143 MMOL/L (ref 133–144)

## 2020-08-04 PROCEDURE — 36415 COLL VENOUS BLD VENIPUNCTURE: CPT | Performed by: INTERNAL MEDICINE

## 2020-08-04 PROCEDURE — 99214 OFFICE O/P EST MOD 30 MIN: CPT | Performed by: FAMILY MEDICINE

## 2020-08-04 PROCEDURE — 80048 BASIC METABOLIC PNL TOTAL CA: CPT | Performed by: INTERNAL MEDICINE

## 2020-08-04 PROCEDURE — 85018 HEMOGLOBIN: CPT | Performed by: INTERNAL MEDICINE

## 2020-08-04 RX ORDER — FERROUS SULFATE 325(65) MG
325 TABLET ORAL EVERY OTHER DAY
Qty: 90 TABLET | Refills: 0 | Status: SHIPPED | OUTPATIENT
Start: 2020-08-04 | End: 2023-11-01

## 2020-08-04 ASSESSMENT — PAIN SCALES - GENERAL: PAINLEVEL: NO PAIN (0)

## 2020-08-04 NOTE — PROGRESS NOTES
Subjective     Marilou Saldivar is a 65 year old male who presents to clinic today for the following health issues:    [facility]         Hospital Follow-up Visit:    Hospital/Nursing Home/IP Rehab Facility: [facility]  Date of Admission: 07/26/20  Date of Discharge: 07/29/20  Reason(s) for Admission: hematuria and severe pain, syncope      Was your hospitalization related to COVID-19? No   Problems taking medications regularly:  None  Medication changes since discharge: holding off on xarelto and requip. Added Flomax  Problems adhering to non-medication therapy:  None    Summary of hospitalization:  [facility] discharge summary reviewed  Diagnostic Tests/Treatments reviewed.  Follow up needed: none  Other Healthcare Providers Involved in Patient s Care:         None  Update since discharge: improved.       Post Discharge Medication Reconciliation: discharge medications reconciled and changed, per note/orders.  Plan of care communicated with patient                65-year-old with a history of bladder cancer that was complicated by blood clots in the bladder and acute urinary retention and obstruction with acute renal failure.  Now feeling better.  Creatinine trending back towards baseline CKD 3.  Feels like his energy is improving.  Has remained off of his blood thinner at the direction of his cardiologist.      Reviewed and updated as needed this visit by Provider         Review of Systems   Constitutional, HEENT, cardiovascular, pulmonary, gi and gu systems are negative, except as otherwise noted.      Objective    BP (!) 160/70   Pulse 81   Temp 97.6  F (36.4  C) (Tympanic)   Resp 12   Wt 69 kg (152 lb 2 oz)   SpO2 100%   BMI 23.83 kg/m    Body mass index is 23.83 kg/m .  Physical Exam   .  Male no distress.  Heart regular.  Lungs clear unlabored peer extremities well-perfused no edema    Diagnostic Test Results:  Labs reviewed in Epic        Assessment & Plan       ICD-10-CM    1. Anemia  due to blood loss, acute-transfused 1u PRBC 7/26  D62 ferrous sulfate (FEROSUL) 325 (65 Fe) MG tablet   2. CKD (chronic kidney disease) stage 3, GFR 30-59 ml/min (H)  N18.3    3. Malignant neoplasm of posterior wall of urinary bladder (H)-s/p cysto 7/23/20  C67.4 **Hemoglobin FUTURE 14d     **Basic metabolic panel FUTURE 1yr     He is doing well.  I added iron to help with his anemia.  May take 2 or 3 times weekly.  He will follow-up with urology in terms of his bladder malignancy.  We will see cardiology coming up within the month to discuss the value of his blood thinner after ablation.  Unfortunately he is unaware of his A. fib when it occurs.    Blood pressure noted to be elevated today.  But this is the first time, no history of hypertension.  I asked him to start monitoring at home and call us in 2 weeks with numbers.  If still elevated, would come in for recheck and could make a diagnosis of hypertension at that time.    No follow-ups on file.    Kamran Constantino MD  [facility]

## 2020-08-06 ENCOUNTER — VIRTUAL VISIT (OUTPATIENT)
Dept: UROLOGY | Facility: CLINIC | Age: 65
End: 2020-08-06
Payer: COMMERCIAL

## 2020-08-06 ENCOUNTER — NURSE TRIAGE (OUTPATIENT)
Dept: NURSING | Facility: CLINIC | Age: 65
End: 2020-08-06

## 2020-08-06 DIAGNOSIS — C67.4 MALIGNANT NEOPLASM OF POSTERIOR WALL OF URINARY BLADDER (H): Primary | ICD-10-CM

## 2020-08-06 PROCEDURE — 99213 OFFICE O/P EST LOW 20 MIN: CPT | Mod: 95 | Performed by: UROLOGY

## 2020-08-06 RX ORDER — LIDOCAINE HYDROCHLORIDE 20 MG/ML
10 JELLY TOPICAL
Status: CANCELLED | OUTPATIENT
Start: 2020-10-01

## 2020-08-06 RX ORDER — LIDOCAINE HYDROCHLORIDE 20 MG/ML
10 JELLY TOPICAL
Status: CANCELLED | OUTPATIENT
Start: 2020-08-27

## 2020-08-06 RX ORDER — LIDOCAINE HYDROCHLORIDE 20 MG/ML
10 JELLY TOPICAL
Status: CANCELLED | OUTPATIENT
Start: 2020-09-10

## 2020-08-06 RX ORDER — LIDOCAINE HYDROCHLORIDE 20 MG/ML
10 JELLY TOPICAL
Status: CANCELLED | OUTPATIENT
Start: 2020-09-03

## 2020-08-06 RX ORDER — LIDOCAINE HYDROCHLORIDE 20 MG/ML
10 JELLY TOPICAL
Status: CANCELLED | OUTPATIENT
Start: 2020-09-24

## 2020-08-06 RX ORDER — LIDOCAINE HYDROCHLORIDE 20 MG/ML
10 JELLY TOPICAL
Status: CANCELLED | OUTPATIENT
Start: 2020-09-17

## 2020-08-06 NOTE — TELEPHONE ENCOUNTER
Patient needs chest xray and TB screening that were ordered today during a virtual visit with , to be done/scheduled at [facility]. He will call the clinic during office hours.    Kylah Humphrey RN  [facility]    Reason for Disposition    [1] Caller requesting NON-URGENT health information AND [2] PCP's office is the best resource    Protocols used: INFORMATION ONLY CALL-A-AH

## 2020-08-06 NOTE — PROGRESS NOTES
"Marilou Saldivar is a 65 year old male who is being evaluated via a billable video visit.      The patient has been notified of following:     \"This video visit will be conducted via a call between you and your physician/provider. We have found that certain health care needs can be provided without the need for an in-person physical exam.  This service lets us provide the care you need with a video conversation.  If a prescription is necessary we can send it directly to your pharmacy.  If lab work is needed we can place an order for that and you can then stop by our lab to have the test done at a later time.    Video visits are billed at different rates depending on your insurance coverage.  Please reach out to your insurance provider with any questions.    If during the course of the call the physician/provider feels a video visit is not appropriate, you will not be charged for this service.\"    Patient has given verbal consent for Video visit? Yes  How would you like to obtain your AVS? MyChart  If you are dropped from the video visit, the video invite should be resent to: Text to cell phone:    Will anyone else be joining your video visit? No        Video-Visit Details    Type of service:  Video Visit    Video Start Time: 930  Video End Time: 9:45 AM    Originating Location (pt. Location): Home    Distant Location (provider location):  [facility]     Platform used for Video Visit: Chrsi Ta MD        "

## 2020-08-06 NOTE — PROGRESS NOTES
Chief Complaint   Patient presents with     Video Visit       Marilou Saldivar is a 65 year old male who presents today for follow up of   Chief Complaint   Patient presents with     Video Visit    f/u post turbt complicated by post op clot evacuation after patient started on blood thinner.  He is doing well without any complaints.    Current Outpatient Medications   Medication Sig Dispense Refill     ferrous sulfate (FEROSUL) 325 (65 Fe) MG tablet Take 1 tablet (325 mg) by mouth every other day Take with orange juice 90 tablet 0     Metoprolol Succinate 25 MG CS24 TAKE 1 TABLET BY MOUTH ONCE DAILY HOLD IF PULSE IS LESS THEN 60 BPM       rOPINIRole (REQUIP) 0.5 MG tablet Take 1 tablet daily 3 hours before bedtime       tamsulosin (FLOMAX) 0.4 MG capsule Take 1 capsule (0.4 mg) by mouth daily 30 capsule 1     No Known Allergies   Past Medical History:   Diagnosis Date     Atrial fibrillation (H)      Past Surgical History:   Procedure Laterality Date     CYSTOSCOPY, BIOPSY BLADDER, COMBINED N/A 2020    Procedure: Cystoscopy, biopsy bladder, combined;  Surgeon: Bret Ta MD;  Location: [facility] OR     CYSTOSCOPY, FULGURATE BLEEDERS, EVACUATE CLOT(S), COMBINED N/A 2020    Procedure: CYSTOSCOPY with  Thrombus Removal;  Surgeon: Bret Ta MD;  Location: [facility] OR     CYSTOSCOPY, TRANSURETHRAL RESECTION (TUR) TUMOR BLADDER INSTILL CHEMOTHERAPY, COMBINED N/A 2020    Procedure: CYSTOSCOPY, WITH TRANSURETHRAL RESECTION OF BLADDER TUMOR AND INSTILLATION OF CHEMOTHERAPEUTIC AGENT INTO BLADDER;  Surgeon: Bret Ta MD;  Location: [facility] OR     EP ICD      ablation     Family History   Problem Relation Age of Onset     Heart Disease Mother         heart bypass     [place]'s disease Mother      Heart Disease Father      No Known Problems Brother      Unknown/Adopted Sister      No Known Problems Son      No Known Problems Daughter      No Known Problems Brother      Heart Disease Brother           of MI at 48     Social History     Socioeconomic History     Marital status:      Spouse name: Not on file     Number of children: Not on file     Years of education: Not on file     Highest education level: Not on file   Occupational History     Not on file   Social Needs     Financial resource strain: Not on file     Food insecurity     Worry: Not on file     Inability: Not on file     Transportation needs     Medical: Not on file     Non-medical: Not on file   Tobacco Use     Smoking status: Never Smoker     Smokeless tobacco: Never Used   Substance and Sexual Activity     Alcohol use: Never     Frequency: Never     Drug use: Never     Sexual activity: Yes     Partners: Female   Lifestyle     Physical activity     Days per week: Not on file     Minutes per session: Not on file     Stress: Not on file   Relationships     Social connections     Talks on phone: Not on file     Gets together: Not on file     Attends Protestant service: Not on file     Active member of club or organization: Not on file     Attends meetings of clubs or organizations: Not on file     Relationship status: Not on file     Intimate partner violence     Fear of current or ex partner: Not on file     Emotionally abused: Not on file     Physically abused: Not on file     Forced sexual activity: Not on file   Other Topics Concern     Not on file   Social History Narrative     Not on file       REVIEW OF SYSTEMS  =================  C: NEGATIVE for fever, chills, change in weight  I: NEGATIVE for worrisome rashes, moles or lesions  E/M: NEGATIVE for ear, mouth and throat problems  R: NEGATIVE for significant cough or SHORTNESS OF BREATH  CV:  NEGATIVE for chest pain, palpitations or peripheral edema  GI: NEGATIVE for nausea, abdominal pain, heartburn, or change in bowel habits  NEURO: NEGATIVE numbness/weakness  : see HPI  PSYCH: NEGATIVE depression/anxiety  LYmph: no new enlarged lymph nodes  Ortho: no new trauma/movements    Physical  Exam:     GENERAL: healthy, alert and no distress  EYES: Eyes grossly normal to inspection, conjunctivae and sclerae normal  RESP: no audible wheeze, cough, or visible cyanosis.  No visible retractions or increased work of breathing.  Able to speak fully in complete sentences.  NEURO: Cranial nerves grossly intact, mentation intact and speech normal  PSYCH: mentation appears normal, affect normal/bright, judgement and insight intact, normal speech and appearance well-groomed    Assessment/Plan:   (C67.4) Malignant neoplasm of posterior wall of urinary bladder (H)  (primary encounter diagnosis)  Comment:  Stage T1 grade 3 TCC  Plan: discussed bcg           Start bcg for 6 weeks           Cysto in 3 months           Need CXR and mentaux skin test

## 2020-08-11 ENCOUNTER — ALLIED HEALTH/NURSE VISIT (OUTPATIENT)
Dept: FAMILY MEDICINE | Facility: CLINIC | Age: 65
End: 2020-08-11
Payer: COMMERCIAL

## 2020-08-11 ENCOUNTER — HOSPITAL ENCOUNTER (OUTPATIENT)
Dept: GENERAL RADIOLOGY | Facility: CLINIC | Age: 65
Discharge: HOME | End: 2020-08-11
Attending: UROLOGY | Admitting: UROLOGY
Payer: COMMERCIAL

## 2020-08-11 ENCOUNTER — TELEPHONE (OUTPATIENT)
Dept: FAMILY MEDICINE | Facility: CLINIC | Age: 65
End: 2020-08-11

## 2020-08-11 DIAGNOSIS — Z11.1 SCREENING EXAMINATION FOR PULMONARY TUBERCULOSIS: Primary | ICD-10-CM

## 2020-08-11 DIAGNOSIS — C67.4 MALIGNANT NEOPLASM OF POSTERIOR WALL OF URINARY BLADDER (H): ICD-10-CM

## 2020-08-11 PROCEDURE — 99207 ZZC NO CHARGE NURSE ONLY: CPT

## 2020-08-11 PROCEDURE — 86580 TB INTRADERMAL TEST: CPT

## 2020-08-11 PROCEDURE — 71046 X-RAY EXAM CHEST 2 VIEWS: CPT | Mod: TC

## 2020-08-11 NOTE — PROGRESS NOTES
Chief Complaint   Patient presents with     Mantoux Administration     Allied Health Visit     The patient is asked the following questions today and these are his answers:    -Have you had a mantoux administered in the past 30 days?    No  -Have you had a previous positive Mantoux.  No  -Have you received BCG in the past.  No  -Have you had a live vaccine  (MMR, Varicella, OPV, Yellow Fever) in the last 6 weeks.  No  -Have you had and active  viral or bacterial infection in the past 6 weeks.  No  -Have you received corticosteroids or immunosuppressive agents in the past 6 weeks.  No  -Have you been diagnosed with HIV?  No  -Do you have a malignancy?  No    Mantoux Questionnaire: answers were all negative.    Clinic Administered Medication Documentation      Injectable Medication Documentation    Patient was given mantoux. Prior to medication administration, verified patients identity using patient s name and date of birth. Please see MAR and medication order for additional information. Patient instructed to remain in clinic for 15 minutes and report any adverse reaction to staff immediately .      Was entire vial of medication used? No  Vial/Syringe: Multi dose vial  Expiration Date:  May 24 2021  Was this medication supplied by the patient? No

## 2020-08-11 NOTE — TELEPHONE ENCOUNTER
Reason for Call:  Other call back    Detailed comments: Patient Is calling wondering what his lab results were from his post op. (have no idea where I was suppose to send this, he wasn't very clear) please advise.     Phone Number Patient can be reached at: Cell number on file:    Telephone Information:   Mobile [phone]       Best Time: Anytime     Can we leave a detailed message on this number? YES    Call taken on 8/11/2020 at 9:19 AM by Miranda Seipel Vaccari

## 2020-08-14 ENCOUNTER — ALLIED HEALTH/NURSE VISIT (OUTPATIENT)
Dept: FAMILY MEDICINE | Facility: CLINIC | Age: 65
End: 2020-08-14
Payer: COMMERCIAL

## 2020-08-14 DIAGNOSIS — Z11.1 TUBERCULIN SKIN TEST READING ENCOUNTER: Primary | ICD-10-CM

## 2020-08-14 LAB
PPDINDURATION: 0 MM (ref 0–5)
PPDREDNESS: 0 MM

## 2020-08-14 PROCEDURE — 99207 ZZC NO CHARGE NURSE ONLY: CPT

## 2020-08-14 NOTE — PROGRESS NOTES
Mantoux result:  Lab Results   Component Value Date    PPDREDNESS 0 08/14/2020    PPDINDURATIO 0 08/14/2020     Is induration greater than 5mm?  No     Dr. Miles looked at patient's arm as it was 1 minute over the allotted 72 hours.  Per Dr. Miles, reading is clear.  Closing this encounter.  Stephania Morales, BSN, RN

## 2020-08-18 ENCOUNTER — TELEPHONE (OUTPATIENT)
Dept: UROLOGY | Facility: CLINIC | Age: 65
End: 2020-08-18

## 2020-08-18 NOTE — TELEPHONE ENCOUNTER
Reason for call:  Other   Patient called regarding (reason for call): Questions  Additional comments: Patient has some questions on infusion for bladder cancer, please call.     Phone number to reach patient:  Cell number on file:    Telephone Information:   Mobile [phone]       Best Time:  any    Can we leave a detailed message on this number?  NO    Travel screening: Not Applicable

## 2020-08-20 NOTE — TELEPHONE ENCOUNTER
Patient was notified and stated Dr. Constantino wanted him to track his BP, so he is going to call back with his numbers.     Savannah Issa CMA ([facility]) 8/20/2020

## 2020-08-20 NOTE — TELEPHONE ENCOUNTER
Per Dr. Constantino he's liver function and hemoglobin are stable and no need to repeat them.  Stephania Baig MA

## 2020-08-20 NOTE — TELEPHONE ENCOUNTER
Patient states, that the hospital says that they authorize Dougie to relay these results as it was a hospital doctor who order them when he was admitted, (I don't see an encounter regarding that)  please advise.

## 2020-08-25 NOTE — TELEPHONE ENCOUNTER
I called pt and Per Dr. Constantino he's bp readings are good and are at about 90% at goal and to follow up with us next month with is appt.  Stephania Baig MA   ]

## 2020-08-25 NOTE — TELEPHONE ENCOUNTER
Pt calling back with BP readings.   8.10 131/71 and 125/69. 8.11 129/70 and 119/66. 8.12 147/81 and 128/69. 8.13 148/75 and 110/64. 8.14 115/66 and 117/59. 8.15 102/58 and 113/61. 8/16 109/65. 8.17 131/63. 8.18 128/68. 8.20 146/67 and 130/67. 8.21 125/67. 8.22 134/72. 8.23 110/85. Please advise.

## 2020-08-26 DIAGNOSIS — C67.4 MALIGNANT NEOPLASM OF POSTERIOR WALL OF URINARY BLADDER (H): Primary | ICD-10-CM

## 2020-08-26 RX ORDER — LEVOFLOXACIN 500 MG/1
500 TABLET, FILM COATED ORAL DAILY
Qty: 12 TABLET | Refills: 0 | Status: SHIPPED | OUTPATIENT
Start: 2020-08-26 | End: 2020-09-15

## 2020-08-27 ENCOUNTER — INFUSION THERAPY VISIT (OUTPATIENT)
Dept: INFUSION THERAPY | Facility: CLINIC | Age: 65
End: 2020-08-27
Payer: COMMERCIAL

## 2020-08-27 VITALS
RESPIRATION RATE: 16 BRPM | HEART RATE: 53 BPM | SYSTOLIC BLOOD PRESSURE: 170 MMHG | OXYGEN SATURATION: 100 % | TEMPERATURE: 97.8 F | DIASTOLIC BLOOD PRESSURE: 82 MMHG

## 2020-08-27 DIAGNOSIS — C67.4 MALIGNANT NEOPLASM OF POSTERIOR WALL OF URINARY BLADDER (H): Primary | ICD-10-CM

## 2020-08-27 DIAGNOSIS — C67.4 MALIGNANT NEOPLASM OF POSTERIOR WALL OF URINARY BLADDER (H): ICD-10-CM

## 2020-08-27 LAB
ALBUMIN UR-MCNC: NEGATIVE MG/DL
APPEARANCE UR: CLEAR
BILIRUB UR QL STRIP: NEGATIVE
COLOR UR AUTO: ABNORMAL
GLUCOSE UR STRIP-MCNC: NEGATIVE MG/DL
HGB UR QL STRIP: ABNORMAL
KETONES UR STRIP-MCNC: NEGATIVE MG/DL
LEUKOCYTE ESTERASE UR QL STRIP: ABNORMAL
NITRATE UR QL: NEGATIVE
PH UR STRIP: 6 PH (ref 5–7)
SOURCE: ABNORMAL
SP GR UR STRIP: 1.01 (ref 1–1.03)
UROBILINOGEN UR STRIP-MCNC: NORMAL MG/DL (ref 0–2)

## 2020-08-27 PROCEDURE — 81003 URINALYSIS AUTO W/O SCOPE: CPT | Performed by: UROLOGY

## 2020-08-27 PROCEDURE — 99207 ZZC NO CHARGE NURSE ONLY: CPT

## 2020-08-27 PROCEDURE — 51720 TREATMENT OF BLADDER LESION: CPT | Performed by: NURSE PRACTITIONER

## 2020-08-27 RX ORDER — LIDOCAINE HYDROCHLORIDE 20 MG/ML
10 JELLY TOPICAL
Status: DISCONTINUED | OUTPATIENT
Start: 2020-08-27 | End: 2020-08-27 | Stop reason: HOSPADM

## 2020-08-27 RX ADMIN — LIDOCAINE HYDROCHLORIDE 10 ML: 20 JELLY TOPICAL at 07:52

## 2020-08-27 ASSESSMENT — PAIN SCALES - GENERAL: PAINLEVEL: NO PAIN (0)

## 2020-08-27 NOTE — PROGRESS NOTES
Infusion Nursing Note:  Marilou Saldivar presents today for C1D1 BCG   Instillation number 1 of 1.   Patient seen by provider today: No   present during visit today: Not Applicable.    Treatment Conditions:   Patient states no concerns or issues at this time.  No fevers. Not taking remicade, humira, cimzia, enbrel. Ok to proceed with treatment.     Labs Reviewed:  Urine analysis.  Results meet treatment conditions.   Negative nitrates. Trace blood, but color clear and yellow.  Diastolic elevated. Marilou reports he is nervous today. He does monitor at home and reports good readings. Systolic reading meets parameters    Procedure:  16F Coude catheter placed.  Lidocaine urojet 2% 10ml used.  Catheter placed without trauma.  Clear/yellow urine drained from bladder.    Patient turned every 15 minutes per protocol: Yes, turning completed at clinic per protocol.  Patient tolerated instillation without incident. Able to hold BCG for the full 2 hours.      Discharge Plan:   Patient discharged in stable condition accompanied by: self.  Departure Mode: Ambulatory.    Written and verbal instruction given regarding bathroom precautions and cleaning for 6 hours post BCG. Marilou verbalized understanding of this teaching. On call phone number also given to Marilou.    Marilou going to  levaquin in the pharmacy upon leaving today. Reviewed need to take dose at 2:30pm today and tomorrow morning. Understanding verbalized.    Judy Machado, RN, RN

## 2020-09-03 ENCOUNTER — INFUSION THERAPY VISIT (OUTPATIENT)
Dept: INFUSION THERAPY | Facility: CLINIC | Age: 65
End: 2020-09-03
Payer: COMMERCIAL

## 2020-09-03 VITALS
WEIGHT: 157.7 LBS | RESPIRATION RATE: 18 BRPM | HEART RATE: 52 BPM | OXYGEN SATURATION: 100 % | SYSTOLIC BLOOD PRESSURE: 165 MMHG | TEMPERATURE: 98.3 F | DIASTOLIC BLOOD PRESSURE: 83 MMHG | BODY MASS INDEX: 24.7 KG/M2

## 2020-09-03 DIAGNOSIS — C67.4 MALIGNANT NEOPLASM OF POSTERIOR WALL OF URINARY BLADDER (H): Primary | ICD-10-CM

## 2020-09-03 DIAGNOSIS — C67.4 MALIGNANT NEOPLASM OF POSTERIOR WALL OF URINARY BLADDER (H): ICD-10-CM

## 2020-09-03 LAB
ALBUMIN UR-MCNC: NEGATIVE MG/DL
APPEARANCE UR: CLEAR
BILIRUB UR QL STRIP: NEGATIVE
COLOR UR AUTO: ABNORMAL
GLUCOSE UR STRIP-MCNC: NEGATIVE MG/DL
HGB UR QL STRIP: ABNORMAL
KETONES UR STRIP-MCNC: NEGATIVE MG/DL
LEUKOCYTE ESTERASE UR QL STRIP: NEGATIVE
NITRATE UR QL: NEGATIVE
PH UR STRIP: 5.5 PH (ref 5–7)
SOURCE: ABNORMAL
SP GR UR STRIP: 1.01 (ref 1–1.03)
UROBILINOGEN UR STRIP-MCNC: NORMAL MG/DL (ref 0–2)

## 2020-09-03 PROCEDURE — 99207 ZZC NO CHARGE LOS: CPT

## 2020-09-03 PROCEDURE — 81003 URINALYSIS AUTO W/O SCOPE: CPT | Performed by: UROLOGY

## 2020-09-03 PROCEDURE — 51720 TREATMENT OF BLADDER LESION: CPT | Performed by: INTERNAL MEDICINE

## 2020-09-03 RX ORDER — LIDOCAINE HYDROCHLORIDE 20 MG/ML
10 JELLY TOPICAL
Status: DISCONTINUED | OUTPATIENT
Start: 2020-09-03 | End: 2020-09-03 | Stop reason: HOSPADM

## 2020-09-03 RX ADMIN — LIDOCAINE HYDROCHLORIDE 10 ML: 20 JELLY TOPICAL at 08:00

## 2020-09-03 NOTE — PROGRESS NOTES
Infusion Nursing Note:  Marilou Saldivar presents today for Dose 2 BCG      Patient seen by provider today: No   present during visit today: Not Applicable.     Treatment Conditions:   Patient states no concerns or issues at this time.  No fevers. Not taking remicade, humira, cimzia, enbrel. Ok to proceed with treatment.      Labs Reviewed:  Urine analysis.  Results meet treatment conditions.   Negative nitrates. Trace blood, but color clear and yellow.  Marilou did not take his Metoprolol this morning     Procedure:  16F Coude catheter placed.  Lidocaine urojet 2% 10ml used.  Catheter placed without trauma.  Clear/yellow urine drained from bladder.    Patient turned every 15 minutes per protocol: Yes, turning completed at clinic per protocol.  Patient tolerated instillation without incident. Able to hold BCG for the full 2 hours.        Discharge Plan:   Patient discharged in stable condition accompanied by: self.  Departure Mode: Ambulatory.     Written and verbal instruction discussed regarding bathroom precautions and cleaning for 6 hours post BCG. Marilou verbalized understanding of this teaching. On call phone number also given to Marilou at last infusion appointment     Has levaquin. Reviewed need to take dose at 6 hours post  today and tomorrow morning. Understanding verbalized.     Vandana Heck RN

## 2020-09-10 ENCOUNTER — INFUSION THERAPY VISIT (OUTPATIENT)
Dept: INFUSION THERAPY | Facility: CLINIC | Age: 65
End: 2020-09-10
Payer: COMMERCIAL

## 2020-09-10 VITALS
BODY MASS INDEX: 24.32 KG/M2 | DIASTOLIC BLOOD PRESSURE: 79 MMHG | WEIGHT: 155.3 LBS | TEMPERATURE: 98.1 F | SYSTOLIC BLOOD PRESSURE: 164 MMHG | OXYGEN SATURATION: 100 % | RESPIRATION RATE: 16 BRPM | HEART RATE: 54 BPM

## 2020-09-10 DIAGNOSIS — C67.4 MALIGNANT NEOPLASM OF POSTERIOR WALL OF URINARY BLADDER (H): ICD-10-CM

## 2020-09-10 DIAGNOSIS — C67.4 MALIGNANT NEOPLASM OF POSTERIOR WALL OF URINARY BLADDER (H): Primary | ICD-10-CM

## 2020-09-10 LAB
ALBUMIN UR-MCNC: NEGATIVE MG/DL
APPEARANCE UR: CLEAR
BILIRUB UR QL STRIP: NEGATIVE
COLOR UR AUTO: NORMAL
GLUCOSE UR STRIP-MCNC: NEGATIVE MG/DL
HGB UR QL STRIP: NEGATIVE
KETONES UR STRIP-MCNC: NEGATIVE MG/DL
LEUKOCYTE ESTERASE UR QL STRIP: NEGATIVE
NITRATE UR QL: NEGATIVE
PH UR STRIP: 6 PH (ref 5–7)
SOURCE: NORMAL
SP GR UR STRIP: 1.01 (ref 1–1.03)
UROBILINOGEN UR STRIP-MCNC: NORMAL MG/DL (ref 0–2)

## 2020-09-10 PROCEDURE — 99207 ZZC NO CHARGE NURSE ONLY: CPT

## 2020-09-10 PROCEDURE — 51720 TREATMENT OF BLADDER LESION: CPT | Performed by: NURSE PRACTITIONER

## 2020-09-10 PROCEDURE — 81003 URINALYSIS AUTO W/O SCOPE: CPT | Performed by: UROLOGY

## 2020-09-10 RX ORDER — LIDOCAINE HYDROCHLORIDE 20 MG/ML
10 JELLY TOPICAL
Status: DISCONTINUED | OUTPATIENT
Start: 2020-09-10 | End: 2020-09-10 | Stop reason: HOSPADM

## 2020-09-10 ASSESSMENT — PAIN SCALES - GENERAL: PAINLEVEL: NO PAIN (0)

## 2020-09-10 NOTE — PROGRESS NOTES
Infusion Nursing Note:  Marilou Saldivar presents today for BCG.  Instillation number 3 of 6.   Patient seen by provider today: No   present during visit today: Not Applicable.    Note: Patient has oral Levaquin and will take 6 hours after treatment, and again tomorrow morning.    Treatment Conditions:   Patient states he is not taking Remicade, Humira, Cimzia or Enbrel.  Denies visible hematuria, temperature >/= 100 F, increased burning, frequency, urgency and chills.    Labs Reviewed:  Urine analysis.  Results meet treatment conditions.   Negative for nitrates.    Procedure:  16F Coude catheter placed.  Lidocaine urojet 2% 10ml used.  Catheter placed without trauma.  Clear/yellow urine drained from bladder.    Patient turned every 15 minutes per protocol: Yes, turning completed at clinic per protocol.  Patient tolerated instillation without incident.    Discharge Plan:   Discharge instructions reviewed with: Patient.  Patient and/or family verbalized understanding of discharge instructions and all questions answered.  Patient will return 9/17/2020 for next appointment.  Patient discharged in stable condition accompanied by: self.  Departure Mode: Ambulatory.    Halina Monk RN-BSN, PHN, OCN  [facility]

## 2020-09-15 ENCOUNTER — OFFICE VISIT (OUTPATIENT)
Dept: FAMILY MEDICINE | Facility: CLINIC | Age: 65
End: 2020-09-15
Payer: COMMERCIAL

## 2020-09-15 VITALS
BODY MASS INDEX: 24.59 KG/M2 | HEART RATE: 68 BPM | DIASTOLIC BLOOD PRESSURE: 92 MMHG | SYSTOLIC BLOOD PRESSURE: 152 MMHG | TEMPERATURE: 97.3 F | RESPIRATION RATE: 18 BRPM | OXYGEN SATURATION: 100 % | WEIGHT: 157 LBS

## 2020-09-15 DIAGNOSIS — N13.9 ACUTE URINARY OBSTRUCTION: ICD-10-CM

## 2020-09-15 DIAGNOSIS — R31.0 GROSS HEMATURIA: Primary | ICD-10-CM

## 2020-09-15 DIAGNOSIS — I10 ESSENTIAL HYPERTENSION: ICD-10-CM

## 2020-09-15 LAB
ANION GAP SERPL CALCULATED.3IONS-SCNC: 4 MMOL/L (ref 3–14)
BUN SERPL-MCNC: 26 MG/DL (ref 7–30)
CALCIUM SERPL-MCNC: 9 MG/DL (ref 8.5–10.1)
CHLORIDE SERPL-SCNC: 110 MMOL/L (ref 94–109)
CO2 SERPL-SCNC: 27 MMOL/L (ref 20–32)
CREAT SERPL-MCNC: 1.45 MG/DL (ref 0.66–1.25)
GFR SERPL CREATININE-BSD FRML MDRD: 50 ML/MIN/{1.73_M2}
GLUCOSE SERPL-MCNC: 89 MG/DL (ref 70–99)
HGB BLD-MCNC: 10.8 G/DL (ref 13.3–17.7)
POTASSIUM SERPL-SCNC: 4.6 MMOL/L (ref 3.4–5.3)
SODIUM SERPL-SCNC: 141 MMOL/L (ref 133–144)

## 2020-09-15 PROCEDURE — 99213 OFFICE O/P EST LOW 20 MIN: CPT | Performed by: FAMILY MEDICINE

## 2020-09-15 PROCEDURE — 85018 HEMOGLOBIN: CPT | Performed by: FAMILY MEDICINE

## 2020-09-15 PROCEDURE — 80048 BASIC METABOLIC PNL TOTAL CA: CPT | Performed by: FAMILY MEDICINE

## 2020-09-15 PROCEDURE — 36415 COLL VENOUS BLD VENIPUNCTURE: CPT | Performed by: FAMILY MEDICINE

## 2020-09-15 ASSESSMENT — PAIN SCALES - GENERAL: PAINLEVEL: NO PAIN (0)

## 2020-09-15 NOTE — LETTER
September 17, 2020      Marilou Saldivar  [address] [place] [facility] [address]        Dear ,    We are writing to inform you of your test results.    Kidney function is slowly improving. Please avoid nsaids during this time (naproxen, ibuprofen, etc) tylenol is ok for pain. Hemoglobin is also slowly improving. Stay on iron until we're back to normal. Lets check again in a month. Please contact me for orders, thanks       Kamran Constantino MD     Resulted Orders   Hemoglobin   Result Value Ref Range    Hemoglobin 10.8 (L) 13.3 - 17.7 g/dL   Basic metabolic panel   Result Value Ref Range    Sodium 141 133 - 144 mmol/L    Potassium 4.6 3.4 - 5.3 mmol/L    Chloride 110 (H) 94 - 109 mmol/L    Carbon Dioxide 27 20 - 32 mmol/L    Anion Gap 4 3 - 14 mmol/L    Glucose 89 70 - 99 mg/dL    Urea Nitrogen 26 7 - 30 mg/dL    Creatinine 1.45 (H) 0.66 - 1.25 mg/dL    GFR Estimate 50 (L) >60 mL/min/[1.73_m2]      Comment:      Non  GFR Calc  Starting 12/18/2018, serum creatinine based estimated GFR (eGFR) will be   calculated using the Chronic Kidney Disease Epidemiology Collaboration   (CKD-EPI) equation.      GFR Estimate If Black 58 (L) >60 mL/min/[1.73_m2]      Comment:       GFR Calc  Starting 12/18/2018, serum creatinine based estimated GFR (eGFR) will be   calculated using the Chronic Kidney Disease Epidemiology Collaboration   (CKD-EPI) equation.      Calcium 9.0 8.5 - 10.1 mg/dL       If you have any questions or concerns, please call the clinic at the number listed above.       Sincerely,        Kamran Constantino MD

## 2020-09-15 NOTE — PROGRESS NOTES
Subjective     New Patient/Transfer of Care/ recheck bp     Marilou is a 65 year old male who comes to clinic to discuss his blood pressure.  Feels well.  Has a history of gross hematuria and has been followed by urology.  Blood pressure has been mildly elevated.  Has been on metoprolol in the past.  For atrial fibrillation.      Review of Systems   Constitutional, HEENT, cardiovascular, pulmonary, gi and gu systems are negative, except as otherwise noted.      Objective    BP (!) 152/92   Pulse 68   Temp 97.3  F (36.3  C) (Temporal)   Resp 18   Wt 71.2 kg (157 lb)   SpO2 100%   BMI 24.59 kg/m       Wt Readings from Last 2 Encounters:   09/15/20 71.2 kg (157 lb)   09/10/20 70.4 kg (155 lb 4.8 oz)       Physical Exam   GENERAL: healthy, alert and no distress  NECK: no adenopathy, no asymmetry, masses, or scars and thyroid normal to palpation  RESP: lungs clear to auscultation - no rales, rhonchi or wheezes  CV: regular rate and rhythm, normal S1 S2, no S3 or S4, no murmur, click or rub, no peripheral edema and peripheral pulses strong  ABDOMEN: soft, nontender, no hepatosplenomegaly, no masses and bowel sounds normal  MS: no gross musculoskeletal defects noted, no edema    Diagnostic Test Results:  Labs reviewed in Epic        Assessment & Plan       ICD-10-CM    1. Gross hematuria  R31.0 Hemoglobin   2. Acute urinary obstruction  N13.9 Basic metabolic panel   3. Essential hypertension  I10        Mildly elevated blood pressure.  Start home monitoring and call me with results in 2 to 3 weeks.  If markedly elevated would add lisinopril at 10 mg.    Return in about 1 month (around 10/15/2020).    Kamran Constantino MD  [facility]

## 2020-09-17 ENCOUNTER — INFUSION THERAPY VISIT (OUTPATIENT)
Dept: INFUSION THERAPY | Facility: CLINIC | Age: 65
End: 2020-09-17
Payer: COMMERCIAL

## 2020-09-17 VITALS
OXYGEN SATURATION: 98 % | HEART RATE: 60 BPM | RESPIRATION RATE: 16 BRPM | SYSTOLIC BLOOD PRESSURE: 174 MMHG | DIASTOLIC BLOOD PRESSURE: 90 MMHG | TEMPERATURE: 98 F

## 2020-09-17 DIAGNOSIS — C67.4 MALIGNANT NEOPLASM OF POSTERIOR WALL OF URINARY BLADDER (H): Primary | ICD-10-CM

## 2020-09-17 DIAGNOSIS — C67.4 MALIGNANT NEOPLASM OF POSTERIOR WALL OF URINARY BLADDER (H): ICD-10-CM

## 2020-09-17 PROCEDURE — 99207 ZZC NO CHARGE LOS: CPT

## 2020-09-17 PROCEDURE — 81003 URINALYSIS AUTO W/O SCOPE: CPT | Performed by: UROLOGY

## 2020-09-17 PROCEDURE — 51720 TREATMENT OF BLADDER LESION: CPT | Performed by: NURSE PRACTITIONER

## 2020-09-17 RX ORDER — LIDOCAINE HYDROCHLORIDE 20 MG/ML
10 JELLY TOPICAL
Status: DISCONTINUED | OUTPATIENT
Start: 2020-09-17 | End: 2020-09-17 | Stop reason: HOSPADM

## 2020-09-17 RX ADMIN — LIDOCAINE HYDROCHLORIDE 10 ML: 20 JELLY TOPICAL at 07:55

## 2020-09-17 ASSESSMENT — PAIN SCALES - GENERAL: PAINLEVEL: NO PAIN (0)

## 2020-09-17 NOTE — PROGRESS NOTES
Infusion Nursing Note:  Marilou Saldivar presents today for BCG.  nstillation number 4 of 6.   Patient seen by provider today: No   present during visit today: Not Applicable.    Note: Patient denies fevers, chills or hematuria. Denies anti-TNF inhibitor medications. Leaquin with patient as he is going to work post BCG treatment today. Will take levaquin around 2:30 PM and tomorrow morning.       Treatment Conditions:   Patient states no concerns or issues at this time.  Ok to proceed with treatment.     Labs Reviewed:  Urine analysis.  Results meet treatment conditions.     Procedure:  16F Coude catheter placed.  Lidocaine urojet 2% 10ml used.  Catheter placed without trauma.  Clear/yellow urine drained from bladder.    Patient turned every 15 minutes per protocol: Yes, turning completed at clinic per protocol.  Patient tolerated instillation without incident.      Discharge Plan:   Discharge instructions reviewed with: Patient.  Patient and/or family verbalized understanding of discharge instructions and all questions answered.  Patient discharged in stable condition accompanied by: self.  Departure Mode: Ambulatory.    Liz Cash, RN, RN

## 2020-09-24 ENCOUNTER — INFUSION THERAPY VISIT (OUTPATIENT)
Dept: INFUSION THERAPY | Facility: CLINIC | Age: 65
End: 2020-09-24
Payer: COMMERCIAL

## 2020-09-24 VITALS
HEART RATE: 56 BPM | TEMPERATURE: 97.8 F | OXYGEN SATURATION: 100 % | SYSTOLIC BLOOD PRESSURE: 164 MMHG | RESPIRATION RATE: 16 BRPM | DIASTOLIC BLOOD PRESSURE: 84 MMHG

## 2020-09-24 DIAGNOSIS — C67.4 MALIGNANT NEOPLASM OF POSTERIOR WALL OF URINARY BLADDER (H): Primary | ICD-10-CM

## 2020-09-24 DIAGNOSIS — C67.4 MALIGNANT NEOPLASM OF POSTERIOR WALL OF URINARY BLADDER (H): ICD-10-CM

## 2020-09-24 LAB
ALBUMIN UR-MCNC: NEGATIVE MG/DL
APPEARANCE UR: CLEAR
BILIRUB UR QL STRIP: NEGATIVE
COLOR UR AUTO: YELLOW
GLUCOSE UR STRIP-MCNC: NEGATIVE MG/DL
HGB UR QL STRIP: NEGATIVE
KETONES UR STRIP-MCNC: 5 MG/DL
LEUKOCYTE ESTERASE UR QL STRIP: NEGATIVE
NITRATE UR QL: NEGATIVE
PH UR STRIP: 5.5 PH (ref 5–7)
SOURCE: ABNORMAL
SP GR UR STRIP: 1.02 (ref 1–1.03)
UROBILINOGEN UR STRIP-MCNC: NORMAL MG/DL (ref 0–2)

## 2020-09-24 PROCEDURE — 81003 URINALYSIS AUTO W/O SCOPE: CPT | Performed by: UROLOGY

## 2020-09-24 PROCEDURE — 99207 ZZC NO CHARGE LOS: CPT

## 2020-09-24 PROCEDURE — 51720 TREATMENT OF BLADDER LESION: CPT | Performed by: NURSE PRACTITIONER

## 2020-09-24 RX ORDER — LIDOCAINE HYDROCHLORIDE 20 MG/ML
10 JELLY TOPICAL
Status: DISCONTINUED | OUTPATIENT
Start: 2020-09-24 | End: 2020-09-24 | Stop reason: HOSPADM

## 2020-09-24 RX ADMIN — LIDOCAINE HYDROCHLORIDE 10 ML: 20 JELLY TOPICAL at 07:47

## 2020-09-24 ASSESSMENT — PAIN SCALES - GENERAL: PAINLEVEL: NO PAIN (0)

## 2020-09-24 NOTE — PROGRESS NOTES
Infusion Nursing Note:  Marilou Saldivar presents today for BCG.  Instillation number 5 of 6.   Patient seen by provider today: No   present during visit today: Not Applicable.    Note: Patient reports feeling well, with no visible hematuria or fevers. Has his levaquin to take with him this afternoon while at work. No medication additions or changes since last week.    Treatment Conditions:   Patient states no concerns or issues at this time.  Ok to proceed with treatment.     Labs Reviewed:  Urine analysis.  Results meet treatment conditions.     Procedure:  16F Coude catheter placed.  Lidocaine urojet 2% 10ml used.  Catheter placed without trauma.  Clear/yellow urine drained from bladder.    Patient turned every 15 minutes per protocol: Yes, turning completed at clinic per protocol.  Bladder irrigated per protocol.  Patient tolerated instillation without incident.      Discharge Plan:   Discharge instructions reviewed with: Patient.  Patient and/or family verbalized understanding of discharge instructions and all questions answered.  Patient discharged in stable condition accompanied by: self.  Departure Mode: Ambulatory.    Liz Cash, RICKEY, RN

## 2020-10-01 ENCOUNTER — INFUSION THERAPY VISIT (OUTPATIENT)
Dept: INFUSION THERAPY | Facility: CLINIC | Age: 65
End: 2020-10-01
Payer: COMMERCIAL

## 2020-10-01 VITALS
HEART RATE: 50 BPM | SYSTOLIC BLOOD PRESSURE: 174 MMHG | TEMPERATURE: 98.2 F | OXYGEN SATURATION: 98 % | DIASTOLIC BLOOD PRESSURE: 86 MMHG | RESPIRATION RATE: 16 BRPM

## 2020-10-01 DIAGNOSIS — C67.4 MALIGNANT NEOPLASM OF POSTERIOR WALL OF URINARY BLADDER (H): Primary | ICD-10-CM

## 2020-10-01 DIAGNOSIS — C67.4 MALIGNANT NEOPLASM OF POSTERIOR WALL OF URINARY BLADDER (H): ICD-10-CM

## 2020-10-01 LAB
ALBUMIN UR-MCNC: NEGATIVE MG/DL
APPEARANCE UR: CLEAR
BILIRUB UR QL STRIP: NEGATIVE
COLOR UR AUTO: NORMAL
GLUCOSE UR STRIP-MCNC: NEGATIVE MG/DL
HGB UR QL STRIP: NEGATIVE
KETONES UR STRIP-MCNC: NEGATIVE MG/DL
LEUKOCYTE ESTERASE UR QL STRIP: NEGATIVE
NITRATE UR QL: NEGATIVE
PH UR STRIP: 6.5 PH (ref 5–7)
SOURCE: NORMAL
SP GR UR STRIP: 1.01 (ref 1–1.03)
UROBILINOGEN UR STRIP-MCNC: NORMAL MG/DL (ref 0–2)

## 2020-10-01 PROCEDURE — 51720 TREATMENT OF BLADDER LESION: CPT | Performed by: INTERNAL MEDICINE

## 2020-10-01 PROCEDURE — 81003 URINALYSIS AUTO W/O SCOPE: CPT | Performed by: UROLOGY

## 2020-10-01 RX ORDER — LIDOCAINE HYDROCHLORIDE 20 MG/ML
10 JELLY TOPICAL
Status: DISCONTINUED | OUTPATIENT
Start: 2020-10-01 | End: 2020-10-01 | Stop reason: HOSPADM

## 2020-10-01 RX ADMIN — LIDOCAINE HYDROCHLORIDE 10 ML: 20 JELLY TOPICAL at 07:42

## 2020-10-01 ASSESSMENT — PAIN SCALES - GENERAL: PAINLEVEL: MILD PAIN (2)

## 2020-10-01 NOTE — PROGRESS NOTES
Infusion Nursing Note:  Marilou Saldivar presents today for BCG.  Instillation number 6 of 6.   Patient seen by provider today: No   present during visit today: Not Applicable.    Note: Patient denies hematuria,burning, fevers or chills. No new medications or any anti-tnf inhibitors.  Levaquin to be taken around 4:30 pm today and then again tomorrow morning. Patient verbalized understanding.    Treatment Conditions:   Patient states no concerns or issues at this time.  Ok to proceed with treatment.     Labs Reviewed:  Urine analysis.    Procedure:  16F Coude catheter placed.  Lidocaine urojet 2% 10ml used.  Catheter placed without trauma.  Clear/yellow urine drained from bladder.    Patient turned every 15 minutes per protocol: Yes, turning completed at clinic per protocol.  Bladder irrigated per protocol.  Patient tolerated instillation without incident.      Discharge Plan:   Discharge instructions reviewed with: Patient.  Patient and/or family verbalized understanding of discharge instructions and all questions answered.  Patient discharged in stable condition accompanied by: self.  Departure Mode: Ambulatory.    Liz Cash, RN, RN

## 2020-11-04 ENCOUNTER — TELEPHONE (OUTPATIENT)
Dept: UROLOGY | Facility: CLINIC | Age: 65
End: 2020-11-04

## 2020-11-04 NOTE — TELEPHONE ENCOUNTER
Spoke to patient.   Patient has been experiencing cold like symptoms that include congestion and cough.   Patient denies fever or COVID testing.   At this point appointment rescheduled.   Liana Hernandez RN

## 2020-11-04 NOTE — TELEPHONE ENCOUNTER
Reason for call:  Symptom   Symptom or request:  Head stuffiness, cough.     Duration (how long have symptoms been present):  A few days  Have you been treated for this before? No    Additional comments:  Has appt on Friday. Can he keep it or reschedule?    Phone number to reach patient:  Home number on file [phone] (home)    Best Time:   Any     Can we leave a detailed message on this number?  YES    Travel screening: Not Applicable

## 2020-12-15 ENCOUNTER — OFFICE VISIT (OUTPATIENT)
Dept: UROLOGY | Facility: CLINIC | Age: 65
End: 2020-12-15
Payer: COMMERCIAL

## 2020-12-15 DIAGNOSIS — Z85.51 PERSONAL HISTORY OF MALIGNANT NEOPLASM OF BLADDER: Primary | ICD-10-CM

## 2020-12-15 DIAGNOSIS — C67.4 MALIGNANT NEOPLASM OF POSTERIOR WALL OF URINARY BLADDER (H): ICD-10-CM

## 2020-12-15 PROCEDURE — 52000 CYSTOURETHROSCOPY: CPT | Performed by: UROLOGY

## 2020-12-15 RX ORDER — LIDOCAINE HYDROCHLORIDE 20 MG/ML
10 JELLY TOPICAL
Status: CANCELLED | OUTPATIENT
Start: 2021-01-14

## 2020-12-15 RX ORDER — LIDOCAINE HYDROCHLORIDE 20 MG/ML
10 JELLY TOPICAL
Status: CANCELLED | OUTPATIENT
Start: 2021-01-07

## 2020-12-15 RX ORDER — LIDOCAINE HYDROCHLORIDE 20 MG/ML
10 JELLY TOPICAL
Status: CANCELLED | OUTPATIENT
Start: 2020-12-21

## 2020-12-15 NOTE — PROGRESS NOTES
S: Marilou Saldivar is a 65 year old male returns for bladder cancer surveillance.    he has history of bladder cancer Grade 3 non-invasive, Stage t1, s/p turbt on 7/ 20.     He received 1 courses of BCG therapy.    Patient is draped and prepped.  Flexible cystoscopy placed under direct vision.    Cysto:  The anterior urethra is normal   The prostatic urethra is normal.     The length is 1cm,  the coaptation is 1 cm.     In the bladder there is normal mucosa.    Assessment/Plan:  (Z85.51) Personal history of malignant neoplasm of bladder  (primary encounter diagnosis)  Comment: no evidence of cancer recurrence  Plan: CYSTOURETHROSCOPY ([id number])        Start maintenance bcg therapy         BTR in 3 months

## 2020-12-21 ENCOUNTER — TELEPHONE (OUTPATIENT)
Dept: EMERGENCY MEDICINE | Facility: CLINIC | Age: 65
End: 2020-12-21

## 2020-12-21 DIAGNOSIS — C67.4 MALIGNANT NEOPLASM OF POSTERIOR WALL OF URINARY BLADDER (H): Primary | ICD-10-CM

## 2020-12-21 DIAGNOSIS — C67.4 MALIGNANT NEOPLASM OF POSTERIOR WALL OF URINARY BLADDER (H): ICD-10-CM

## 2020-12-21 LAB
LABORATORY COMMENT REPORT: ABNORMAL
SARS-COV-2 RNA SPEC QL NAA+PROBE: NORMAL
SARS-COV-2 RNA SPEC QL NAA+PROBE: POSITIVE
SPECIMEN SOURCE: ABNORMAL
SPECIMEN SOURCE: NORMAL

## 2020-12-21 PROCEDURE — U0003 INFECTIOUS AGENT DETECTION BY NUCLEIC ACID (DNA OR RNA); SEVERE ACUTE RESPIRATORY SYNDROME CORONAVIRUS 2 (SARS-COV-2) (CORONAVIRUS DISEASE [COVID-19]), AMPLIFIED PROBE TECHNIQUE, MAKING USE OF HIGH THROUGHPUT TECHNOLOGIES AS DESCRIBED BY CMS-2020-01-R: HCPCS | Performed by: UROLOGY

## 2020-12-21 NOTE — TELEPHONE ENCOUNTER
"Coronavirus (COVID-19) Notification    Caller Name (Patient, parent, daughter/son, grandparent, etc)  patient    Reason for call  Notify of Positive Coronavirus (COVID-19) lab results, assess symptoms,  review  [facility] recommendations    Lab Result    Lab test:  2019-nCoV rRt-PCR or SARS-CoV-2 PCR    Oropharyngeal AND/OR nasopharyngeal swabs is POSITIVE for 2019-nCoV RNA/SARS-COV-2 PCR (COVID-19 virus)    RN Recommendations/Instructions per [facility] Coronavirus COVID-19 recommendations    Brief introduction script  Introduce self then review script:  \"I am calling on behalf of [employer].  We were notified that your Coronavirus test (COVID-19) for was POSITIVE for the virus.  I have some information to relay to you but first I wanted to mention that the [place] Dept of Health will be contacting you shortly [it's possible MD already called Patient] to talk to you more about how you are feeling and other people you have had contact with who might now also have the virus.  Also,  [employer] is Partnering with the [facility] for Covid-19 research, you may be contacted directly by research staff.\"    Assessment (Inquire about Patient's current symptoms)   Assessment   Current Symptoms at time of phone call: (if no symptoms, document No symptoms] No symptoms   Symptoms onset (if applicable) n/a     If at time of call, Patients symptoms hare worsened, the Patient should contact 911 or have someone drive them to Emergency Dept promptly:      If Patient calling 911, inform 911 personal that you have tested positive for the Coronavirus (COVID-19).  Place mask on and await 911 to arrive.    If Emergency Dept, If possible, please have another adult drive you to the Emergency Dept but you need to wear mask when in contact with other people.      Monoclonal Antibody Administration    You may be eligible to receive a new treatment with a monoclonal antibody for preventing hospitalization in patients " "at high risk for complications from COVID-19.   This medication is still experimental and available on a limited basis; it is given through an IV and must be given at an infusion center. Please note that not all people who are eligible will receive the medication since it is in limited supply.     Are you interested in being considered for this medication?  No.   Does the patient fit the criteria: Patient declined    If patient qualifies based on above criteria:  \"We will contact you if you are selected to receive the medication in the next 1-2 days.   This is time sensitive and if you are not selected in the next 1-2 days, you will not receive the medication.  If you do not receive a call to schedule, you have not been selected.\"    Review information with Patient    Your result was positive. This means you have COVID-19 (coronavirus).  We have sent you a letter that reviews the information that I'll be reviewing with you now.    How can I protect others?    If you have symptoms: stay home and away from others (self-isolate) until:    You've had no fever--and no medicine that reduces fever--for 1 full day (24 hours). And       Your other symptoms have gotten better. For example, your cough or breathing has improved. And     At least 10 days have passed since your symptoms started. (If you've been told by a doctor that you have a weak immune system, wait 20 days.)     If you don't have symptoms: Stay home and away from others (self-isolate) until at least 10 days have passed since your first positive COVID-19 test. (Date test collected)    During this time:    Stay in your own room, including for meals. Use your own bathroom if you can.    Stay away from others in your home. No hugging, kissing or shaking hands. No visitors.     Don't go to work, school or anywhere else.     Clean  high touch  surfaces often (doorknobs, counters, handles, etc.). Use a household cleaning spray or wipes. You'll find a full list on the " EPA website at www.epa.gov/pesticide-registration/list-n-disinfectants-use-against-sars-cov-2.     Cover your mouth and nose with a mask, tissue or other face covering to avoid spreading germs.    Wash your hands and face often with soap and water.    Caregivers in these groups are at risk for severe illness due to COVID-19:  o People 65 years and older  o People who live in a nursing home or long-term care facility  o People with chronic disease (lung, heart, cancer, diabetes, kidney, liver, immunologic)  o People who have a weakened immune system, including those who:  - Are in cancer treatment  - Take medicine that weakens the immune system, such as corticosteroids  - Had a bone marrow or organ transplant  - Have an immune deficiency  - Have poorly controlled HIV or AIDS  - Are obese (body mass index of 40 or higher)  - Smoke regularly    Caregivers should wear gloves while washing dishes, handling laundry and cleaning bedrooms and bathrooms.    Wash and dry laundry with special caution. Don't shake dirty laundry, and use the warmest water setting you can.    If you have a weakened immune system, ask your doctor about other actions you should take.    For more tips, go to www.cdc.gov/coronavirus/2019-ncov/downloads/10Things.pdf.    You should not go back to work until you meet the guidelines above for ending your home isolation. You don't need to be retested for COVID-19 before going back to work--studies show that you won't spread the virus if it's been at least 10 days since your symptoms started (or 20 days, if you have a weak immune system).    Employers: This document serves as formal notice of your employee's medical guidelines for going back to work. They must meet the above guidelines before going back to work in person.    How can I take care of myself?    1. Get lots of rest. Drink extra fluids (unless a doctor has told you not to).    2. Take Tylenol (acetaminophen) for fever or pain. If you have liver  or kidney problems, ask your family doctor if it's okay to take Tylenol.     Take either:     650 mg (two 325 mg pills) every 4 to 6 hours, or     1,000 mg (two 500 mg pills) every 8 hours as needed.     Note: Don't take more than 3,000 mg in one day. Acetaminophen is found in many medicines (both prescribed and over-the-counter medicines). Read all labels to be sure you don't take too much.    For children, check the Tylenol bottle for the right dose (based on their age or weight).    3. If you have other health problems (like cancer, heart failure, an organ transplant or severe kidney disease): Call your specialty clinic if you don't feel better in the next 2 days.    4. Know when to call 911: Emergency warning signs include:    Trouble breathing or shortness of breath    Pain or pressure in the chest that doesn't go away    Feeling confused like you haven't felt before, or not being able to wake up    Bluish-colored lips or face    5. Sign up for [employer]. We know it's scary to hear that you have COVID-19. We want to track your symptoms to make sure you're okay over the next 2 weeks. Please look for an email from [employer]--this is a free, online program that we'll use to keep in touch. To sign up, follow the link in the email. Learn more at [email or web address].    Where can I get more information?    [facility]: [email or web address]    Coronavirus Basics: www.health.[facility].[place]/diseases/coronavirus/basics.html    What to Do If You're Sick: www.cdc.gov/coronavirus/2019-ncov/about/steps-when-sick.html    Ending Home Isolation: www.cdc.gov/coronavirus/2019-ncov/hcp/disposition-in-home-patients.html     Caring for Someone with COVID-19: www.cdc.gov/coronavirus/2019-ncov/if-you-are-sick/care-for-someone.html     [facility] clinical trials (COVID-19 research studies): clinicalaffairs.[facility]/n-clinical-trials     A Positive COVID-19 letter will be sent via [employer] or the  mail. (Exception, no letters sent to Presurgerical/Preprocedure Patients)    Ruthy Frey LPN

## 2021-01-05 ENCOUNTER — TELEPHONE (OUTPATIENT)
Dept: INFUSION THERAPY | Facility: CLINIC | Age: 66
End: 2021-01-05

## 2021-01-05 NOTE — TELEPHONE ENCOUNTER
Called pt to discuss his Infusion appt for tomorrow.  Pt tested postive on 12/21.  Pt is 17 days post positive test results.  Pt is negative for symptoms.  Plan:  We will proceed with treatment as planned.  Pt would like to stay in clinic to reposition tomorrow.

## 2021-01-06 ENCOUNTER — INFUSION THERAPY VISIT (OUTPATIENT)
Dept: INFUSION THERAPY | Facility: CLINIC | Age: 66
End: 2021-01-06
Attending: UROLOGY
Payer: COMMERCIAL

## 2021-01-06 ENCOUNTER — APPOINTMENT (OUTPATIENT)
Dept: LAB | Facility: CLINIC | Age: 66
End: 2021-01-06
Payer: COMMERCIAL

## 2021-01-06 VITALS
SYSTOLIC BLOOD PRESSURE: 156 MMHG | TEMPERATURE: 98 F | RESPIRATION RATE: 18 BRPM | WEIGHT: 150.2 LBS | DIASTOLIC BLOOD PRESSURE: 80 MMHG | HEART RATE: 58 BPM | OXYGEN SATURATION: 98 % | BODY MASS INDEX: 23.52 KG/M2

## 2021-01-06 DIAGNOSIS — C67.4 MALIGNANT NEOPLASM OF POSTERIOR WALL OF URINARY BLADDER (H): Primary | ICD-10-CM

## 2021-01-06 LAB
ALBUMIN UR-MCNC: NEGATIVE MG/DL
APPEARANCE UR: CLEAR
BILIRUB UR QL STRIP: NEGATIVE
COLOR UR AUTO: YELLOW
GLUCOSE UR STRIP-MCNC: NEGATIVE MG/DL
HGB UR QL STRIP: NEGATIVE
KETONES UR STRIP-MCNC: NEGATIVE MG/DL
LEUKOCYTE ESTERASE UR QL STRIP: NEGATIVE
NITRATE UR QL: NEGATIVE
PH UR STRIP: 6 PH (ref 5–7)
SOURCE: NORMAL
SP GR UR STRIP: 1.02 (ref 1–1.03)
UROBILINOGEN UR STRIP-MCNC: 0 MG/DL (ref 0–2)

## 2021-01-06 PROCEDURE — 81003 URINALYSIS AUTO W/O SCOPE: CPT | Performed by: UROLOGY

## 2021-01-06 PROCEDURE — 250N000009 HC RX 250: Performed by: UROLOGY

## 2021-01-06 PROCEDURE — 250N000011 HC RX IP 250 OP 636: Performed by: UROLOGY

## 2021-01-06 PROCEDURE — 51720 TREATMENT OF BLADDER LESION: CPT

## 2021-01-06 RX ORDER — LEVOFLOXACIN 500 MG/1
TABLET, FILM COATED ORAL
Qty: 6 TABLET | Refills: 6 | Status: SHIPPED | OUTPATIENT
Start: 2021-01-06 | End: 2023-11-01

## 2021-01-06 RX ORDER — LIDOCAINE HYDROCHLORIDE 20 MG/ML
10 JELLY TOPICAL
Status: DISCONTINUED | OUTPATIENT
Start: 2021-01-06 | End: 2021-01-06 | Stop reason: HOSPADM

## 2021-01-06 RX ADMIN — LIDOCAINE HYDROCHLORIDE 10 ML: 20 JELLY TOPICAL at 14:59

## 2021-01-06 RX ADMIN — BACILLUS CALMETTE-GUERIN 50 MG: 50 POWDER, FOR SUSPENSION INTRAVESICAL at 15:24

## 2021-01-06 ASSESSMENT — PAIN SCALES - GENERAL
PAINLEVEL: NO PAIN (0)
PAINLEVEL: NO PAIN (0)

## 2021-01-06 NOTE — PATIENT INSTRUCTIONS
Take Levaquin as prescribed. First dose at 9:30 pm this evening (1/6/21)  Call clinic if new or increased symptoms of abd or bladder pain or blood in urine.   Return for next BCG appt 1/13/21.

## 2021-01-06 NOTE — PROGRESS NOTES
7Infusion Nursing Note:  Marilou Sadlivar presents today for Day 1/4 BCG maintenance therapy.    Patient seen by provider today: No   present during visit today: Not Applicable.    Note: Patient states his 6 BCG treatments that he received starting last August went well for him. Today states he has no visible hematuria, denies pain, is afebrile. U/A WNL. Hx of Afib, Ablation and HTN. Takes medication for this. Had sleep study but does not have sleep apnea, dx with restless legs. B/P 146/77, HR 65 today. Oral temp 98.3  Oriented patient to our dept, room, call light and treatment plan.   Prepped for Catheter insertion, lidocaine w/ Urojet used prior. 16Fr [place] cath inserted without difficulty or resistance.   Clear yellow urine returned.   BCG given approx 10 min after catheter inserted. Patient tolerated well and denies pain. Stayed in Infusion to do repositioning every 15 minutes for the first hour and then was discharged to home.     Intravenous Access:  No Intravenous access/labs at this visit.    Treatment Conditions:  Results reviewed, labs MET treatment parameters, ok to proceed with treatment.  Urine WNL, negative nitrites and blood.      Post Infusion Assessment:  Patient tolerated BCG, see above note.  VSS following.       Discharge Plan:   Copy of AVS reviewed with patient. Discharge instructions/BCG handout reviewed including when to void next and antibiotic schedule. States understanding and has no further questions. He is picking up his Levaquin prescription in pharmacy after leaving today. Patient will return 1/13/21 for next appointment.  Patient discharged in stable condition accompanied by: self.  Departure Mode: Ambulatory.    Darshana Ryan RN

## 2021-01-13 ENCOUNTER — APPOINTMENT (OUTPATIENT)
Dept: LAB | Facility: CLINIC | Age: 66
End: 2021-01-13
Payer: COMMERCIAL

## 2021-01-13 ENCOUNTER — INFUSION THERAPY VISIT (OUTPATIENT)
Dept: INFUSION THERAPY | Facility: CLINIC | Age: 66
End: 2021-01-13
Attending: UROLOGY
Payer: COMMERCIAL

## 2021-01-13 VITALS
HEIGHT: 68 IN | DIASTOLIC BLOOD PRESSURE: 72 MMHG | HEART RATE: 68 BPM | TEMPERATURE: 98 F | RESPIRATION RATE: 16 BRPM | SYSTOLIC BLOOD PRESSURE: 133 MMHG | BODY MASS INDEX: 23.23 KG/M2 | WEIGHT: 153.3 LBS | OXYGEN SATURATION: 98 %

## 2021-01-13 DIAGNOSIS — C67.4 MALIGNANT NEOPLASM OF POSTERIOR WALL OF URINARY BLADDER (H): Primary | ICD-10-CM

## 2021-01-13 LAB
ALBUMIN UR-MCNC: NEGATIVE MG/DL
APPEARANCE UR: NORMAL
BILIRUB UR QL STRIP: NEGATIVE
COLOR UR AUTO: YELLOW
GLUCOSE UR STRIP-MCNC: NEGATIVE MG/DL
HGB UR QL STRIP: NEGATIVE
KETONES UR STRIP-MCNC: NEGATIVE MG/DL
LEUKOCYTE ESTERASE UR QL STRIP: NEGATIVE
NITRATE UR QL: NEGATIVE
PH UR STRIP: 5 PH (ref 5–7)
SOURCE: NORMAL
SP GR UR STRIP: 1.02 (ref 1–1.03)
UROBILINOGEN UR STRIP-MCNC: 0 MG/DL (ref 0–2)

## 2021-01-13 PROCEDURE — 81003 URINALYSIS AUTO W/O SCOPE: CPT | Performed by: UROLOGY

## 2021-01-13 PROCEDURE — 250N000011 HC RX IP 250 OP 636: Performed by: UROLOGY

## 2021-01-13 PROCEDURE — 51720 TREATMENT OF BLADDER LESION: CPT

## 2021-01-13 PROCEDURE — 250N000009 HC RX 250: Performed by: UROLOGY

## 2021-01-13 RX ORDER — LIDOCAINE HYDROCHLORIDE 20 MG/ML
10 JELLY TOPICAL
Status: DISCONTINUED | OUTPATIENT
Start: 2021-01-13 | End: 2021-01-13 | Stop reason: HOSPADM

## 2021-01-13 RX ORDER — AMLODIPINE BESYLATE 2.5 MG/1
2.5 TABLET ORAL DAILY
COMMUNITY
Start: 2020-12-16 | End: 2022-08-31

## 2021-01-13 RX ADMIN — BACILLUS CALMETTE-GUERIN 50 MG: 50 POWDER, FOR SUSPENSION INTRAVESICAL at 15:30

## 2021-01-13 RX ADMIN — LIDOCAINE HYDROCHLORIDE 10 ML: 20 JELLY TOPICAL at 15:13

## 2021-01-13 ASSESSMENT — PAIN SCALES - GENERAL: PAINLEVEL: NO PAIN (0)

## 2021-01-13 ASSESSMENT — MIFFLIN-ST. JEOR: SCORE: 1449.86

## 2021-01-13 NOTE — PROGRESS NOTES
Infusion Nursing Note:  Marilou Saldivar presents today for BCG Maintenance.  Instillation number 2 of 3.   Patient seen by provider today: No   present during visit today: Not Applicable.    Note: Patient complains of frequency and burning night of last treatment into the next day. Did take the Levaquin as prescribed and states is drinking water to flush system. Went over BCG sheet and is comfortable doing this treatment and seeing how the symptoms are, if go past 2 days with call Dr. Nancy clark.    Treatment Conditions:   Patient complains of the following: See above note, ok to proceed with treatment.    Labs Reviewed:  Urine analysis.  Results meet treatment conditions.   No visible blood or nitrites.    Procedure:  16F Coude catheter placed.  Lidocaine urojet 2% 10ml used.  Catheter placed without trauma.  Clear/yellow urine drained from bladder.    Patient turned every 15 minutes per protocol: Yes, turning completed at clinic per protocol.  Yes, turning to be completed at home per protocol.   Patient tolerated instillation without incident.  Did 1 hour of turning here at infusion then will finish at home and void at 5:30pm.      Discharge Plan:   Discharge instructions reviewed with: Patient.  Patient and/or family verbalized understanding of discharge instructions and all questions answered.  Patient discharged in stable condition accompanied by: self.  Departure Mode: Ambulatory.  Will take Levaquin tonight at 9:30pm and tomorrow at 9:30am.    Daksha Garcia, RN, RN

## 2021-01-13 NOTE — PATIENT INSTRUCTIONS
Pt to return on 01/20/21 for BCG #3 of 3 this maintenance cycle. Copies of medication list and upcoming appointments given prior to discharge.

## 2021-01-20 ENCOUNTER — APPOINTMENT (OUTPATIENT)
Dept: LAB | Facility: CLINIC | Age: 66
End: 2021-01-20
Payer: COMMERCIAL

## 2021-01-20 ENCOUNTER — INFUSION THERAPY VISIT (OUTPATIENT)
Dept: INFUSION THERAPY | Facility: CLINIC | Age: 66
End: 2021-01-20
Attending: UROLOGY
Payer: COMMERCIAL

## 2021-01-20 VITALS
OXYGEN SATURATION: 100 % | BODY MASS INDEX: 23.81 KG/M2 | SYSTOLIC BLOOD PRESSURE: 144 MMHG | WEIGHT: 156.6 LBS | TEMPERATURE: 98.1 F | HEART RATE: 68 BPM | RESPIRATION RATE: 18 BRPM | DIASTOLIC BLOOD PRESSURE: 82 MMHG

## 2021-01-20 DIAGNOSIS — C67.4 MALIGNANT NEOPLASM OF POSTERIOR WALL OF URINARY BLADDER (H): Primary | ICD-10-CM

## 2021-01-20 LAB
ALBUMIN UR-MCNC: NEGATIVE MG/DL
APPEARANCE UR: CLEAR
BILIRUB UR QL STRIP: NEGATIVE
COLOR UR AUTO: YELLOW
GLUCOSE UR STRIP-MCNC: NEGATIVE MG/DL
HGB UR QL STRIP: NEGATIVE
KETONES UR STRIP-MCNC: NEGATIVE MG/DL
LEUKOCYTE ESTERASE UR QL STRIP: NEGATIVE
NITRATE UR QL: NEGATIVE
PH UR STRIP: 6 PH (ref 5–7)
SOURCE: NORMAL
SP GR UR STRIP: 1.01 (ref 1–1.03)
UROBILINOGEN UR STRIP-MCNC: 0 MG/DL (ref 0–2)

## 2021-01-20 PROCEDURE — 88120 CYTP URNE 3-5 PROBES EA SPEC: CPT | Mod: 26 | Performed by: PATHOLOGY

## 2021-01-20 PROCEDURE — 88112 CYTOPATH CELL ENHANCE TECH: CPT | Mod: TC | Performed by: UROLOGY

## 2021-01-20 PROCEDURE — 81003 URINALYSIS AUTO W/O SCOPE: CPT | Performed by: UROLOGY

## 2021-01-20 PROCEDURE — 250N000009 HC RX 250: Performed by: UROLOGY

## 2021-01-20 PROCEDURE — 88112 CYTOPATH CELL ENHANCE TECH: CPT | Mod: 26 | Performed by: PATHOLOGY

## 2021-01-20 PROCEDURE — 88120 CYTP URNE 3-5 PROBES EA SPEC: CPT | Mod: TC | Performed by: UROLOGY

## 2021-01-20 PROCEDURE — 51720 TREATMENT OF BLADDER LESION: CPT

## 2021-01-20 PROCEDURE — 250N000011 HC RX IP 250 OP 636: Performed by: UROLOGY

## 2021-01-20 RX ORDER — LIDOCAINE HYDROCHLORIDE 20 MG/ML
10 JELLY TOPICAL
Status: DISCONTINUED | OUTPATIENT
Start: 2021-01-20 | End: 2021-01-20 | Stop reason: HOSPADM

## 2021-01-20 RX ADMIN — LIDOCAINE HYDROCHLORIDE 10 ML: 20 JELLY TOPICAL at 14:45

## 2021-01-20 RX ADMIN — BACILLUS CALMETTE-GUERIN 50 MG: 50 POWDER, FOR SUSPENSION INTRAVESICAL at 15:07

## 2021-01-20 ASSESSMENT — PAIN SCALES - GENERAL: PAINLEVEL: NO PAIN (0)

## 2021-01-20 NOTE — PROGRESS NOTES
Infusion Nursing Note:  Marilou Saldivar presents today for BCG maintenance therapy.    Patient seen by provider today: No   present during visit today: Not Applicable.    Note: Patient states he had some urinary burning and frequency for about a day after last chemo but was not quite as significant as the prior week. Denies pain today and any visible hematuria. Afebrile. VSS.   Patient did not meet criteria for an asymptomatic covid-19 PCR test in infusion today. Patient declined the covid-19 test.  Procedure: Patient voided prior to catheter placement. Positioned on bed in supine position. 16 Fr Coude Catheter placed 3-5 minutes after Lidocaine with Urojet was instilled. Sterile technique used. Patient tolerated well. Clear yellow urine returned after catheter placed. BCG instilled without difficulty. Patient was turned every 15 minutes for first hour and was discharged to home following. Understanding to have goal to void around 5:10 pm. Patient states will take Levaquin as directed.     Intravenous Access:  No Intravenous access/labs at this visit.    Treatment Conditions:  Urine negative for nitrates. No visible hematuria. Ok to proceed with BCG.      Post Infusion Assessment:  See above procedure note.       Discharge Plan:   Copy of AVS reviewed with patient and/or family.  Patient will return 3/22 for next appointment.  Patient discharged in stable condition accompanied by: self.  Departure Mode: Ambulatory.    Darshana Ryan RN

## 2021-01-25 ENCOUNTER — TELEPHONE (OUTPATIENT)
Dept: UROLOGY | Facility: CLINIC | Age: 66
End: 2021-01-25

## 2021-01-25 NOTE — TELEPHONE ENCOUNTER
Patient was seen in clinic 12/15/2021 and maintenance BCG was ordered.   Patient completed 3 treatments and is now scheduled for 3 more.   Will route to provider to review plan of care.   Liana Hernandez RN

## 2021-02-02 LAB
COPATH REPORT: NORMAL
COPATH REPORT: NORMAL

## 2021-03-17 ENCOUNTER — OFFICE VISIT (OUTPATIENT)
Dept: UROLOGY | Facility: CLINIC | Age: 66
End: 2021-03-17
Payer: COMMERCIAL

## 2021-03-17 DIAGNOSIS — Z85.51 PERSONAL HISTORY OF MALIGNANT NEOPLASM OF BLADDER: Primary | ICD-10-CM

## 2021-03-17 PROCEDURE — 52000 CYSTOURETHROSCOPY: CPT | Performed by: UROLOGY

## 2021-03-17 NOTE — PROGRESS NOTES
S: Marilou Saldivar is a 66 year old male returns for bladder cancer surveillance.    he has history of bladder cancer Grade 3 non-invasive, Stage t1, s/p turbt on 7/ 20.     He received 1 courses of BCG therapy and 1 maintenance course    Patient is draped and prepped.  Flexible cystoscopy placed under direct vision.    Cysto:  The anterior urethra is normal   The prostatic urethra is normal.     The length is 1cm,  the coaptation is 1 cm.     In the bladder there is normal mucosa.    Assessment/Plan:  (Z85.51) Personal history of malignant neoplasm of bladder  (primary encounter diagnosis)  Comment: no evidence of cancer recurrence  Plan: CYSTOURETHROSCOPY (85589)               BTR in 3 months

## 2021-06-30 ENCOUNTER — OFFICE VISIT (OUTPATIENT)
Dept: UROLOGY | Facility: CLINIC | Age: 66
End: 2021-06-30
Payer: COMMERCIAL

## 2021-06-30 DIAGNOSIS — C67.4 MALIGNANT NEOPLASM OF POSTERIOR WALL OF URINARY BLADDER (H): ICD-10-CM

## 2021-06-30 DIAGNOSIS — Z85.51 PERSONAL HISTORY OF MALIGNANT NEOPLASM OF BLADDER: Primary | ICD-10-CM

## 2021-06-30 PROCEDURE — 52000 CYSTOURETHROSCOPY: CPT | Performed by: UROLOGY

## 2021-06-30 NOTE — PROGRESS NOTES
S: Marilou Saldivar is a 66 year old male returns for bladder cancer surveillance.    he has history of bladder cancer Grade 3 non-invasive, Stage t1, s/p turbt on 7/ 20.     He received 1 courses of BCG therapy and 1 maintenance course    Patient is draped and prepped.  Flexible cystoscopy placed under direct vision.    Cysto:  The anterior urethra is normal   The prostatic urethra is normal.     The length is 1cm,  the coaptation is 1 cm.     In the bladder there is normal mucosa.    Assessment/Plan:  (Z85.51) Personal history of malignant neoplasm of bladder  (primary encounter diagnosis)  Comment: no evidence of cancer recurrence  Plan: CYSTOURETHROSCOPY (22974)               BTR in 3 months

## 2021-10-06 ENCOUNTER — OFFICE VISIT (OUTPATIENT)
Dept: UROLOGY | Facility: CLINIC | Age: 66
End: 2021-10-06
Payer: COMMERCIAL

## 2021-10-06 VITALS
HEART RATE: 55 BPM | WEIGHT: 151.7 LBS | OXYGEN SATURATION: 100 % | DIASTOLIC BLOOD PRESSURE: 76 MMHG | BODY MASS INDEX: 23.07 KG/M2 | SYSTOLIC BLOOD PRESSURE: 147 MMHG

## 2021-10-06 DIAGNOSIS — Z85.51 PERSONAL HISTORY OF BLADDER CANCER: Primary | ICD-10-CM

## 2021-10-06 PROCEDURE — 52000 CYSTOURETHROSCOPY: CPT | Performed by: UROLOGY

## 2021-10-06 ASSESSMENT — PAIN SCALES - GENERAL: PAINLEVEL: NO PAIN (0)

## 2021-10-06 NOTE — PROGRESS NOTES
S: Marilou Saldivar is a 66 year old male returns for bladder cancer surveillance.    he has history of bladder cancer Grade 3 non-invasive, Stage t1, s/p turbt on 7/ 20.     He received 1 courses of BCG therapy and 1 maintenance course    Patient is draped and prepped.  Flexible cystoscopy placed under direct vision.    Cysto:  The anterior urethra is normal   The prostatic urethra is normal.     The length is 1cm,  the coaptation is 1 cm.     In the bladder there is normal mucosa.    Assessment/Plan:  (Z85.51) Personal history of malignant neoplasm of bladder  (primary encounter diagnosis)  Comment: no evidence of cancer recurrence  Plan: CYSTOURETHROSCOPY ([id number])               BTR in 3 months

## 2022-01-04 NOTE — ED NOTES
Appointment rescheduled with Dr Luis.  Further evaluation for BPH/Elevated PSA/Urinary Retention.  Possible voiding trial.  1/14/22 at 0800 hours.  Will contact patient once discharged from ED.   Pts spouse came up to nurses station to have pt checked on, stating pt feels like he is leaking blood from his catheter. Writer went into check on pt and noted pt was leaking a moderate amount of blood from penis, around the catheter. Pt reports feeling light headed, skin pale and diaphoretic. HOB lowered, BP checked (see VS flow sheet) and provider notified and in room to check on pt. An IV was started and pt receiving IV fluids, rate of bladder irrigation decreased and pt reports feeling better after interventions. Primary nurse notified and will continue to monitor pt.

## 2022-07-28 ENCOUNTER — TELEPHONE (OUTPATIENT)
Dept: UROLOGY | Facility: CLINIC | Age: 67
End: 2022-07-28

## 2022-07-28 ENCOUNTER — OFFICE VISIT (OUTPATIENT)
Dept: FAMILY MEDICINE | Facility: CLINIC | Age: 67
End: 2022-07-28
Payer: COMMERCIAL

## 2022-07-28 ENCOUNTER — HOSPITAL ENCOUNTER (OUTPATIENT)
Facility: CLINIC | Age: 67
End: 2022-07-28
Attending: FAMILY MEDICINE | Admitting: FAMILY MEDICINE
Payer: COMMERCIAL

## 2022-07-28 ENCOUNTER — TELEPHONE (OUTPATIENT)
Dept: GASTROENTEROLOGY | Facility: CLINIC | Age: 67
End: 2022-07-28

## 2022-07-28 VITALS
SYSTOLIC BLOOD PRESSURE: 150 MMHG | BODY MASS INDEX: 22.58 KG/M2 | DIASTOLIC BLOOD PRESSURE: 80 MMHG | TEMPERATURE: 97.5 F | WEIGHT: 148.5 LBS | RESPIRATION RATE: 16 BRPM | OXYGEN SATURATION: 100 % | HEART RATE: 61 BPM

## 2022-07-28 DIAGNOSIS — Z12.11 SPECIAL SCREENING FOR MALIGNANT NEOPLASMS, COLON: Primary | ICD-10-CM

## 2022-07-28 DIAGNOSIS — D62 ANEMIA DUE TO BLOOD LOSS, ACUTE: ICD-10-CM

## 2022-07-28 DIAGNOSIS — I10 PRIMARY HYPERTENSION: Primary | ICD-10-CM

## 2022-07-28 DIAGNOSIS — Z11.59 NEED FOR HEPATITIS C SCREENING TEST: ICD-10-CM

## 2022-07-28 DIAGNOSIS — Z13.220 SCREENING FOR HYPERLIPIDEMIA: ICD-10-CM

## 2022-07-28 DIAGNOSIS — Z12.11 SCREEN FOR COLON CANCER: ICD-10-CM

## 2022-07-28 LAB
ANION GAP SERPL CALCULATED.3IONS-SCNC: 4 MMOL/L (ref 3–14)
BUN SERPL-MCNC: 24 MG/DL (ref 7–30)
CALCIUM SERPL-MCNC: 9 MG/DL (ref 8.5–10.1)
CHLORIDE BLD-SCNC: 112 MMOL/L (ref 94–109)
CHOLEST SERPL-MCNC: 174 MG/DL
CO2 SERPL-SCNC: 28 MMOL/L (ref 20–32)
CREAT SERPL-MCNC: 1.46 MG/DL (ref 0.66–1.25)
CREAT UR-MCNC: 96 MG/DL
FASTING STATUS PATIENT QL REPORTED: NO
GFR SERPL CREATININE-BSD FRML MDRD: 52 ML/MIN/1.73M2
GLUCOSE BLD-MCNC: 78 MG/DL (ref 70–99)
HCV AB SERPL QL IA: NONREACTIVE
HDLC SERPL-MCNC: 48 MG/DL
HGB BLD-MCNC: 12.7 G/DL (ref 13.3–17.7)
LDLC SERPL CALC-MCNC: 114 MG/DL
MICROALBUMIN UR-MCNC: 12 MG/L
MICROALBUMIN/CREAT UR: 12.5 MG/G CR (ref 0–17)
NONHDLC SERPL-MCNC: 126 MG/DL
POTASSIUM BLD-SCNC: 4.7 MMOL/L (ref 3.4–5.3)
SODIUM SERPL-SCNC: 144 MMOL/L (ref 133–144)
TRIGL SERPL-MCNC: 61 MG/DL

## 2022-07-28 PROCEDURE — 85018 HEMOGLOBIN: CPT | Performed by: FAMILY MEDICINE

## 2022-07-28 PROCEDURE — 90471 IMMUNIZATION ADMIN: CPT | Performed by: FAMILY MEDICINE

## 2022-07-28 PROCEDURE — 82043 UR ALBUMIN QUANTITATIVE: CPT | Performed by: FAMILY MEDICINE

## 2022-07-28 PROCEDURE — 90715 TDAP VACCINE 7 YRS/> IM: CPT | Performed by: FAMILY MEDICINE

## 2022-07-28 PROCEDURE — 80061 LIPID PANEL: CPT | Performed by: FAMILY MEDICINE

## 2022-07-28 PROCEDURE — 86803 HEPATITIS C AB TEST: CPT | Performed by: FAMILY MEDICINE

## 2022-07-28 PROCEDURE — 80048 BASIC METABOLIC PNL TOTAL CA: CPT | Performed by: FAMILY MEDICINE

## 2022-07-28 PROCEDURE — 36415 COLL VENOUS BLD VENIPUNCTURE: CPT | Performed by: FAMILY MEDICINE

## 2022-07-28 PROCEDURE — 99214 OFFICE O/P EST MOD 30 MIN: CPT | Mod: 25 | Performed by: FAMILY MEDICINE

## 2022-07-28 RX ORDER — AMLODIPINE BESYLATE 5 MG/1
5 TABLET ORAL DAILY
Qty: 90 TABLET | Refills: 1 | Status: SHIPPED | OUTPATIENT
Start: 2022-07-28 | End: 2023-01-24

## 2022-07-28 RX ORDER — BISACODYL 5 MG
TABLET, DELAYED RELEASE (ENTERIC COATED) ORAL
Qty: 4 TABLET | Refills: 0 | Status: SHIPPED | OUTPATIENT
Start: 2022-07-28 | End: 2022-09-06 | Stop reason: HOSPADM

## 2022-07-28 ASSESSMENT — PAIN SCALES - GENERAL: PAINLEVEL: NO PAIN (0)

## 2022-07-28 NOTE — TELEPHONE ENCOUNTER
Patient seen 10/21 advised to return in 3 months for BTR. Patient contacted and worked in 8/3/22 at 1:30.  Ghada Dowling CMA

## 2022-07-28 NOTE — PROGRESS NOTES
Prior to immunization administration, verified patients identity using patient s name and date of birth. Please see Immunization Activity for additional information.     Screening Questionnaire for Adult Immunization    Are you sick today?   No   Do you have allergies to medications, food, a vaccine component or latex?   No   Have you ever had a serious reaction after receiving a vaccination?   No   Do you have a long-term health problem with heart, lung, kidney, or metabolic disease (e.g., diabetes), asthma, a blood disorder, no spleen, complement component deficiency, a cochlear implant, or a spinal fluid leak?  Are you on long-term aspirin therapy?   No   Do you have cancer, leukemia, HIV/AIDS, or any other immune system problem?   No   Do you have a parent, brother, or sister with an immune system problem?   No   In the past 3 months, have you taken medications that affect  your immune system, such as prednisone, other steroids, or anticancer drugs; drugs for the treatment of rheumatoid arthritis, Crohn s disease, or psoriasis; or have you had radiation treatments?   No   Have you had a seizure, or a brain or other nervous system problem?   No   During the past year, have you received a transfusion of blood or blood    products, or been given immune (gamma) globulin or antiviral drug?   No   For women: Are you pregnant or is there a chance you could become       pregnant during the next month?   No   Have you received any vaccinations in the past 4 weeks?   No     Immunization questionnaire answers were all negative.        Per orders of Dr. Constantino, injection of Tdap given by Nishi Hernandez MA. Patient instructed to remain in clinic for 15 minutes afterwards, and to report any adverse reaction to me immediately.       Screening performed by Nishi Hernandez MA on 7/28/2022 at 10:29 AM.

## 2022-07-28 NOTE — LETTER
July 29, 2022      Marilou Saldivar  [id number] [facility] [address]        Dear ,    We are writing to inform you of your test results.    Labs are stable. No changes to our plan. Hemoglobin coming up nicely.        Resulted Orders   Albumin Random Urine Quantitative with Creat Ratio   Result Value Ref Range    Creatinine Urine mg/dL 96 mg/dL    Albumin Urine mg/L 12 mg/L    Albumin Urine mg/g Cr 12.50 0.00 - 17.00 mg/g Cr   Hepatitis C Screen Reflex to HCV RNA Quant and Genotype   Result Value Ref Range    Hepatitis C Antibody Nonreactive Nonreactive    Narrative    Assay performance characteristics have not been established for newborns, infants, and children.   Lipid panel reflex to direct LDL Non-fasting   Result Value Ref Range    Cholesterol 174 <200 mg/dL    Triglycerides 61 <150 mg/dL    Direct Measure HDL 48 >=40 mg/dL    LDL Cholesterol Calculated 114 (H) <=100 mg/dL    Non HDL Cholesterol 126 <130 mg/dL    Patient Fasting > 8hrs? No     Narrative    Cholesterol  Desirable:  <200 mg/dL    Triglycerides  Normal:  Less than 150 mg/dL  Borderline High:  150-199 mg/dL  High:  200-499 mg/dL  Very High:  Greater than or equal to 500 mg/dL    Direct Measure HDL  Female:  Greater than or equal to 50 mg/dL   Male:  Greater than or equal to 40 mg/dL    LDL Cholesterol  Desirable:  <100mg/dL  Above Desirable:  100-129 mg/dL   Borderline High:  130-159 mg/dL   High:  160-189 mg/dL   Very High:  >= 190 mg/dL    Non HDL Cholesterol  Desirable:  130 mg/dL  Above Desirable:  130-159 mg/dL  Borderline High:  160-189 mg/dL  High:  190-219 mg/dL  Very High:  Greater than or equal to 220 mg/dL   BASIC METABOLIC PANEL   Result Value Ref Range    Sodium 144 133 - 144 mmol/L    Potassium 4.7 3.4 - 5.3 mmol/L    Chloride 112 (H) 94 - 109 mmol/L    Carbon Dioxide (CO2) 28 20 - 32 mmol/L    Anion Gap 4 3 - 14 mmol/L    Urea Nitrogen 24 7 - 30 mg/dL    Creatinine 1.46 (H) 0.66 - 1.25 mg/dL    Calcium 9.0 8.5 -  10.1 mg/dL    Glucose 78 70 - 99 mg/dL    GFR Estimate 52 (L) >60 mL/min/1.73m2      Comment:      Effective December 21, 2021 eGFRcr in adults is calculated using the 2021 CKD-EPI creatinine equation which includes age and gender (Jeison et al., NEJM, DOI: 10.1056/UBHZgy9049910)   Hemoglobin   Result Value Ref Range    Hemoglobin 12.7 (L) 13.3 - 17.7 g/dL       If you have any questions or concerns, please call the clinic at the number listed above.       Sincerely,      Kamran Constantino MD

## 2022-07-28 NOTE — PROGRESS NOTES
Assessment & Plan       ICD-10-CM    1. Primary hypertension  I10 Albumin Random Urine Quantitative with Creat Ratio     BASIC METABOLIC PANEL     amLODIPine (NORVASC) 5 MG tablet     Albumin Random Urine Quantitative with Creat Ratio     BASIC METABOLIC PANEL   2. Screen for colon cancer  Z12.11 Colonscopy Screening  Referral   3. Need for hepatitis C screening test  Z11.59 Hepatitis C Screen Reflex to HCV RNA Quant and Genotype     Hepatitis C Screen Reflex to HCV RNA Quant and Genotype   4. Screening for hyperlipidemia  Z13.220 Lipid panel reflex to direct LDL Non-fasting     Lipid panel reflex to direct LDL Non-fasting   5. Anemia due to blood loss, acute-transfused 1u PRBC 7/26  D62 Hemoglobin     Hemoglobin        Hypertension.  Not currently at goal.  Does not watch home numbers.  Recheck lab work today.  Increase his amlodipine to 5 mg daily.  Have him check home numbers and return in a month for recheck  Due for screening colonoscopy  Recheck cholesterol  History of anemia related to his bladder tumors and hematuria.  Recheck hemoglobin to make sure it is normalized.  Encourage low-dose aspirin daily for stroke prevention and heart health      Return in about 1 month (around 8/28/2022) for recheck symptoms.    Kamran Constantino MD  [facility] JEFF Zamarripa is a 67 year old male who presents to clinic today for the following health issues     HPI     Answers for HPI/ROS submitted by the patient on 7/28/2022  Do you check your blood pressure regularly outside of the clinic?: No  Are your blood pressures ever more than 140 on the top number (systolic) OR more than 90 on the bottom number (diastolic)? (For example, greater than 140/90): Yes  Are you following a low salt diet?: Yes      Discussed LDA for stroke, CVD risk  No further blood noted in urine  Following regularioly with urlogy        Review of Systems   Constitutional, HEENT, cardiovascular, pulmonary, gi and  gu systems are negative, except as otherwise noted.      Objective    BP (!) 150/80 (BP Location: Right arm, Patient Position: Sitting, Cuff Size: Adult Regular)   Pulse 61   Temp 97.5  F (36.4  C) (Temporal)   Resp 16   Wt 67.4 kg (148 lb 8 oz)   SpO2 100%   BMI 22.58 kg/m       Physical Exam   GENERAL: healthy, alert and no distress  NECK: no adenopathy, no asymmetry, masses, or scars and thyroid normal to palpation  RESP: lungs clear to auscultation - no rales, rhonchi or wheezes  CV: regular rate and rhythm, normal S1 S2, no S3 or S4, no murmur, click or rub, no peripheral edema and peripheral pulses strong  ABDOMEN: soft, nontender, no hepatosplenomegaly, no masses and bowel sounds normal  MS: no gross musculoskeletal defects noted, no edema

## 2022-07-28 NOTE — TELEPHONE ENCOUNTER
Screening Questions    BlueKIND OF PREP RedLOCATION [review exclusion criteria] GreenSEDATION TYPE    Have you had a positive covid test in the last 90 days? N  If yes, what date?     Do you have a legal guardian or medical Power of ?  Are you able to give consent for your medical care?Y (Sedation review/consideration needed)    1. Are you active on mychart? N    2. What insurance is in the chart? ARE MEDICARE     3.   Ordering/Referring Provider: SRIKANTH RUCKER    4. BMI 22.5 [BMI OVER 40-EXTENDED PREP]  If greater than 40 review exclusion criteria [PAC APPT IF (MAC) @ [facility]]      5.  Respiratory Screening:  [If yes to any of the following HOSPITAL setting only]     Do you use daily home oxygen? N    Do you have mod to severe Obstructive Sleep Apnea? N   [OKAY @ [facility]]   Do you have Pulmonary Hypertension? N     Do you have UNCONTROLLED asthma? N        6.   Have you had a heart or lung transplant? N      7.   Are you currently on dialysis? N [ If yes, G-PREP & HOSPITAL setting only]     8.   Do you have chronic kidney disease? Y [ If yes, G-PREP ]    9.   Have you had a stroke or Transient ischemic attack (TIA - aka  mini stroke ) within 6 months?  N (If yes, please review exclusion criteria)         In the past 6 months, have you had any heart related issues including cardiomyopathy or heart attack? N           If yes, did it require cardiac stenting or other implantable device? N      10   Do you have any implantable devices in your body (pacemaker, defib, LVAD)? N (If yes, please review exclusion criteria)    11.   Do you take nitroglycerin? N            If yes, how often? N  (if yes, HOSPITAL setting ONLY)    12.   Are you currently taking any blood thinners? N           [IF YES, INFORM PATIENT TO FOLLOW UP W/ ORDERING PROVIDER FOR BRIDGING INSTRUCTIONS]     13.  22.  Do you take Phentermine? N     Yes-> Hold for 7 days before procedure.  Please consult your prescribing  provider if you have questions about holding this medication.    14.   Do you have a diagnosis of diabetes? N   [ If yes, G-PREP ]    15.   [FEMALES] Are you currently pregnant?     If yes, how many weeks?     16.   Are you taking any prescription pain medications on a routine schedule?  N  [ If yes, EXTENDED PREP.] [If yes, MAC]    17.   Do you have any chemical dependencies such as alcohol, street drugs, or methadone?  N [If yes, MAC]    18.   Do you have any history of post-traumatic stress syndrome, severe anxiety or history of psychosis?  N  [If yes, MAC]    19.   Do you transfer independently? (If NO, please HOSPITAL setting  only)  Y    20.  On a regular basis do you go 3-5 days between bowel movements? N   [ If yes, EXTENDED PREP.]    21.   Preferred LOCAL Pharmacy for Pre Prescription:                            [facility] [address] [address]      Scheduling Details      Caller:   (Please ask for phone number if not scheduled by patient)    Type of Procedure Scheduled: Lower Endoscopy [Colonoscopy]    Which Colonoscopy Prep was Sent?: KEVIN RUSS  PATIENTS & [facility] PATIENTS NEEDS EXTENDED PREP    Surgeon: ALKA  Date of Procedure: 10/17  Location:     Sedation Type: MAC    Conscious Sedation- Needs  for 6 hours after the procedure  MAC/General-Needs  for 24 hours after procedure    Pre-op Required at [facility], [place], [place] and OR for MAC sedation:  N  (advise patient they will need a pre-op WITH IN 30 DAYS prior to procedure -)      Informed patient they will need an adult  Y  Cannot take any type of public or medical transportation alone    Pre-Procedure [facility] or Externally: HOME    Confirmed Nurse will call to complete assessment Y    Additional comments:

## 2022-07-28 NOTE — TELEPHONE ENCOUNTER
Reason for Call:  Other inquiring    Detailed comments: Pt last seen Ta in October and is wondering if he should schedule a Follow-up . He was confused if it should be every 3 months or a yearly Follow-up and primary suggested to pt that he should connect with Ta's team.     Phone Number Patient can be reached at: Cell number on file:    Telephone Information:   Mobile [phone]       Best Time: any    Can we leave a detailed message on this number? YES    Call taken on 7/28/2022 at 10:49 AM by Farida Guadarrama

## 2022-08-03 ENCOUNTER — OFFICE VISIT (OUTPATIENT)
Dept: UROLOGY | Facility: CLINIC | Age: 67
End: 2022-08-03
Payer: COMMERCIAL

## 2022-08-03 DIAGNOSIS — Z85.51 PERSONAL HISTORY OF BLADDER CANCER: Primary | ICD-10-CM

## 2022-08-03 PROCEDURE — 52000 CYSTOURETHROSCOPY: CPT | Performed by: UROLOGY

## 2022-08-03 NOTE — PROGRESS NOTES
S: Marilou Saldivar is a 67 year old male returns for bladder cancer surveillance.    he has history of bladder cancer Grade 3 non-invasive, Stage t1, s/p turbt on 7/ 20.     He received 1 courses of BCG therapy and 1 maintenance course    Patient is draped and prepped.  Flexible cystoscopy placed under direct vision.    Cysto:  The anterior urethra is normal   The prostatic urethra is normal.     The length is 1cm,  the coaptation is 1 cm.     In the bladder there is normal mucosa.    Assessment/Plan:  (Z85.51) Personal history of malignant neoplasm of bladder  (primary encounter diagnosis)  Comment: no evidence of cancer recurrence  Plan: CYSTOURETHROSCOPY ([id number])               BTR in 6 months

## 2022-08-31 ENCOUNTER — OFFICE VISIT (OUTPATIENT)
Dept: FAMILY MEDICINE | Facility: CLINIC | Age: 67
End: 2022-08-31
Payer: COMMERCIAL

## 2022-08-31 VITALS
BODY MASS INDEX: 22.96 KG/M2 | OXYGEN SATURATION: 99 % | HEART RATE: 63 BPM | TEMPERATURE: 98.2 F | RESPIRATION RATE: 16 BRPM | WEIGHT: 151 LBS | SYSTOLIC BLOOD PRESSURE: 130 MMHG | DIASTOLIC BLOOD PRESSURE: 66 MMHG

## 2022-08-31 DIAGNOSIS — Z12.11 SCREEN FOR COLON CANCER: Primary | ICD-10-CM

## 2022-08-31 DIAGNOSIS — I10 PRIMARY HYPERTENSION: ICD-10-CM

## 2022-08-31 PROCEDURE — 99213 OFFICE O/P EST LOW 20 MIN: CPT | Performed by: FAMILY MEDICINE

## 2022-08-31 ASSESSMENT — PAIN SCALES - GENERAL: PAINLEVEL: NO PAIN (0)

## 2022-08-31 NOTE — PROGRESS NOTES
Assessment & Plan       ICD-10-CM    1. Screen for colon cancer  Z12.11    2. Primary hypertension  I10         Doing well.  Blood pressure under good control.  He will  a new blood pressure cuff and begin home monitoring.  Should also return to have it checked for accuracy.  He understands otherwise can see him back in 6 months, assuming he is keeping track of his blood pressures at home      No follow-ups on file.    Kamran Constantino MD  [facility] is a 67 year old male who presents to clinic today for the following health issues     HPI     Answers for HPI/ROS submitted by the patient on 8/31/2022  Do you check your blood pressure regularly outside of the clinic?: Yes  Are your blood pressures ever more than 140 on the top number (systolic) OR more than 90 on the bottom number (diastolic)? (For example, greater than 140/90): Yes  Are you following a low salt diet?: Yes      Returns for follow blood pressure  Taking his higher dose of amlodipine without any difficulty  Checked his blood pressure cuff today which was markedly off      Review of Systems         Objective    /66 (BP Location: Right arm, Patient Position: Sitting, Cuff Size: Adult Regular)   Pulse 63   Temp 98.2  F (36.8  C) (Temporal)   Resp 16   Wt 68.5 kg (151 lb)   SpO2 99%   BMI 22.96 kg/m       Physical Exam

## 2022-09-06 ENCOUNTER — TELEPHONE (OUTPATIENT)
Dept: GASTROENTEROLOGY | Facility: CLINIC | Age: 67
End: 2022-09-06

## 2022-09-18 ENCOUNTER — HEALTH MAINTENANCE LETTER (OUTPATIENT)
Age: 67
End: 2022-09-18

## 2022-11-01 ENCOUNTER — MYC MEDICAL ADVICE (OUTPATIENT)
Dept: GASTROENTEROLOGY | Facility: HOSPITAL | Age: 67
End: 2022-11-01
Payer: COMMERCIAL

## 2022-11-06 ENCOUNTER — ANESTHESIA EVENT (OUTPATIENT)
Dept: GASTROENTEROLOGY | Facility: CLINIC | Age: 67
End: 2022-11-06
Payer: COMMERCIAL

## 2022-11-06 ASSESSMENT — ENCOUNTER SYMPTOMS: DYSRHYTHMIAS: 1

## 2022-11-07 ENCOUNTER — SURGERY (OUTPATIENT)
Age: 67
End: 2022-11-07
Payer: COMMERCIAL

## 2022-11-07 ENCOUNTER — ANESTHESIA (OUTPATIENT)
Dept: GASTROENTEROLOGY | Facility: CLINIC | Age: 67
End: 2022-11-07
Payer: COMMERCIAL

## 2022-11-07 ENCOUNTER — HOSPITAL ENCOUNTER (OUTPATIENT)
Facility: CLINIC | Age: 67
Discharge: HOME | End: 2022-11-07
Attending: FAMILY MEDICINE | Admitting: FAMILY MEDICINE
Payer: COMMERCIAL

## 2022-11-07 VITALS
SYSTOLIC BLOOD PRESSURE: 130 MMHG | RESPIRATION RATE: 16 BRPM | HEART RATE: 64 BPM | TEMPERATURE: 98.6 F | OXYGEN SATURATION: 98 % | DIASTOLIC BLOOD PRESSURE: 83 MMHG

## 2022-11-07 LAB — COLONOSCOPY: NORMAL

## 2022-11-07 PROCEDURE — 88305 TISSUE EXAM BY PATHOLOGIST: CPT | Mod: TC | Performed by: FAMILY MEDICINE

## 2022-11-07 PROCEDURE — 250N000011 HC RX IP 250 OP 636: Performed by: NURSE ANESTHETIST, CERTIFIED REGISTERED

## 2022-11-07 PROCEDURE — 45380 COLONOSCOPY AND BIOPSY: CPT | Performed by: FAMILY MEDICINE

## 2022-11-07 PROCEDURE — 250N000009 HC RX 250: Performed by: NURSE ANESTHETIST, CERTIFIED REGISTERED

## 2022-11-07 PROCEDURE — 370N000017 HC ANESTHESIA TECHNICAL FEE, PER MIN: Performed by: FAMILY MEDICINE

## 2022-11-07 PROCEDURE — 258N000003 HC RX IP 258 OP 636: Performed by: NURSE ANESTHETIST, CERTIFIED REGISTERED

## 2022-11-07 RX ORDER — SODIUM CHLORIDE, SODIUM LACTATE, POTASSIUM CHLORIDE, CALCIUM CHLORIDE 600; 310; 30; 20 MG/100ML; MG/100ML; MG/100ML; MG/100ML
INJECTION, SOLUTION INTRAVENOUS CONTINUOUS
Status: DISCONTINUED | OUTPATIENT
Start: 2022-11-07 | End: 2022-11-07 | Stop reason: HOSPADM

## 2022-11-07 RX ORDER — SODIUM CHLORIDE, SODIUM LACTATE, POTASSIUM CHLORIDE, CALCIUM CHLORIDE 600; 310; 30; 20 MG/100ML; MG/100ML; MG/100ML; MG/100ML
INJECTION, SOLUTION INTRAVENOUS CONTINUOUS PRN
Status: DISCONTINUED | OUTPATIENT
Start: 2022-11-07 | End: 2022-11-07

## 2022-11-07 RX ORDER — PROPOFOL 10 MG/ML
INJECTION, EMULSION INTRAVENOUS CONTINUOUS PRN
Status: DISCONTINUED | OUTPATIENT
Start: 2022-11-07 | End: 2022-11-07

## 2022-11-07 RX ORDER — ONDANSETRON 4 MG/1
4 TABLET, ORALLY DISINTEGRATING ORAL EVERY 30 MIN PRN
Status: DISCONTINUED | OUTPATIENT
Start: 2022-11-07 | End: 2022-11-07 | Stop reason: HOSPADM

## 2022-11-07 RX ORDER — LIDOCAINE HYDROCHLORIDE 20 MG/ML
INJECTION, SOLUTION INFILTRATION; PERINEURAL PRN
Status: DISCONTINUED | OUTPATIENT
Start: 2022-11-07 | End: 2022-11-07

## 2022-11-07 RX ORDER — PROPOFOL 10 MG/ML
INJECTION, EMULSION INTRAVENOUS PRN
Status: DISCONTINUED | OUTPATIENT
Start: 2022-11-07 | End: 2022-11-07

## 2022-11-07 RX ORDER — ONDANSETRON 2 MG/ML
4 INJECTION INTRAMUSCULAR; INTRAVENOUS EVERY 30 MIN PRN
Status: DISCONTINUED | OUTPATIENT
Start: 2022-11-07 | End: 2022-11-07 | Stop reason: HOSPADM

## 2022-11-07 RX ADMIN — LIDOCAINE HYDROCHLORIDE 40 MG: 20 INJECTION, SOLUTION INFILTRATION; PERINEURAL at 11:28

## 2022-11-07 RX ADMIN — SODIUM CHLORIDE, POTASSIUM CHLORIDE, SODIUM LACTATE AND CALCIUM CHLORIDE: 600; 310; 30; 20 INJECTION, SOLUTION INTRAVENOUS at 11:05

## 2022-11-07 RX ADMIN — PROPOFOL 50 MG: 10 INJECTION, EMULSION INTRAVENOUS at 11:29

## 2022-11-07 RX ADMIN — PROPOFOL 150 MCG/KG/MIN: 10 INJECTION, EMULSION INTRAVENOUS at 11:30

## 2022-11-07 ASSESSMENT — ACTIVITIES OF DAILY LIVING (ADL): ADLS_ACUITY_SCORE: 35

## 2022-11-07 NOTE — H&P
"Pre-Endoscopy History and Physical     Marilou Saldivar MRN# [id number]   YOB: 1955 Age: 67 year old     Date of Procedure: (Not on file)  Primary care provider: [facility]  Type of Endoscopy: colonoscopy  Type of Anesthesia Anticipated: MAC     HPI:    Marilou is a 67 year old male who was referred to me for colonoscopy.    Marilou is feeling well today.     Patient Active Problem List   Diagnosis     PAF (paroxysmal atrial fibrillation) (H)     HTN (hypertension)     S/P ablation of atrial fibrillation     Malignant neoplasm of posterior wall of urinary bladder (H)-s/p cysto 7/23/20     Chronic anticoagulation-Xarelto     Gross hematuria     Anemia due to blood loss, acute-transfused 1u PRBC 7/26     Acute kidney injury (H)     Acute urinary obstruction     CKD (chronic kidney disease) stage 3, GFR 30-59 ml/min (H)            PHYSICAL EXAM:   There were no vitals taken for this visit.   Estimated body mass index is 22.96 kg/m  as calculated from the following:    Height as of 1/13/21: 1.727 m (5' 8\").    Weight as of 8/31/22: 68.5 kg (151 lb).    GENERAL APPEARANCE: alert and no distress. Appears to comprehend the procedure and indication  RESP: lungs unlabored  CV: regular  ABD: soft, nt/nd    DIAGNOSTICS:      COVID-19 PCR Results    COVID-19 PCR Results 12/21/20 12/21/20    1053 1053   COVID-19 Virus PCR to [facility] - Result Test received-See reflex to IDDL test SARS CoV2 (COVID-19) Virus RT-PCR    COVID-19 Virus PCR to [facility] - Source Nasopharyngeal    SARS-CoV-2 Virus Specimen Source  Nasopharyngeal   SARS-CoV-2 PCR Result  POSITIVE (A)   (A) Abnormal value       Comments are available for some flowsheets but are not being displayed.         COVID-19 Antibody Results, Testing for Immunity    COVID-19 Antibody Results, Testing for Immunity   No data to display.              IMPRESSION   ASA Class 3 - Severe systemic disease, but not incapacitating        PLAN:     Plan for " colonoscopy. No medical contraindications to proceed, or further work up needed. The risks and benefits of the procedure and the sedation options and risks were discussed with the patient. These include infection, bleeding, and small risk of colon perforation (1/1000 to 1/39861 depending on patient characteristics and type of procedure). Marilou was also explained to alternatives for colo-rectal screening. All questions were answered and informed consent was obtained.      The above has been forwarded to the consulting provider.      Signed Electronically by: Kamran Constantino MD  November 7, 2022

## 2022-11-07 NOTE — DISCHARGE INSTRUCTIONS
[facility]    Home Care Following Endoscopy          Activity:  You have just undergone an endoscopic procedure usually performed with conscious sedation.  Do not work or operate machinery (including a car) for at least 12 hours.    I encourage you to walk and attempt to pass this air as soon as possible.    Diet:  Return to the diet you were on before your procedure but eat lightly for the first 12-24 hours.  Drink plenty of water.  Resume any regular medications unless otherwise advised by your physician.  Please begin any new medication prescribed as a result of your procedure as directed by your physician.   If you had any biopsy or polyp removed please refrain from aspirin or aspirin products for 2 days.  If on Coumadin please restart as instructed by your physician.   Pain:  You may take Tylenol as needed for pain.  Expected Recovery:  You can expect some mild abdominal fullness and/or discomfort due to the air used to inflate your intestinal tract.     Call Your Physician if You Have:    After Colonoscopy:  Worsening persisting abdominal pain which is worse with activity.  Fevers (>101 degrees F), chills or shakes.  Passage of continued blood with bowel movements.     Any questions or concerns about your recovery, please call [phone] or after hours [phone] Nurse Advice Line.    Follow-up Care:  You did have polyps/biopsy tissue sample(s) removed.  The polyps/biopsy tissue sample(s) will be sent to pathology.  You should receive letter in your My Chart from Dr Constantino with your results within 1-2 weeks. If you do not participate in My Chart a physical letter will come in the mail in 2-3 weeks.  Please call if you have not received a notification of your results.              Call [phone].

## 2022-11-07 NOTE — ANESTHESIA PREPROCEDURE EVALUATION
Anesthesia Pre-Procedure Evaluation    Patient: Marilou Saldivar   MRN: [id number] : 1955        Procedure : Procedure(s):  COLONOSCOPY          Past Medical History:   Diagnosis Date     Atrial fibrillation (H)       Past Surgical History:   Procedure Laterality Date     CYSTOSCOPY, BIOPSY BLADDER, COMBINED N/A 2020    Procedure: Cystoscopy, biopsy bladder, combined;  Surgeon: Bret Ta MD;  Location: [facility] OR     CYSTOSCOPY, FULGURATE BLEEDERS, EVACUATE CLOT(S), COMBINED N/A 2020    Procedure: CYSTOSCOPY with  Thrombus Removal;  Surgeon: Bret Ta MD;  Location: [facility] OR     CYSTOSCOPY, TRANSURETHRAL RESECTION (TUR) TUMOR BLADDER INSTILL CHEMOTHERAPY, COMBINED N/A 2020    Procedure: CYSTOSCOPY, WITH TRANSURETHRAL RESECTION OF BLADDER TUMOR AND INSTILLATION OF CHEMOTHERAPEUTIC AGENT INTO BLADDER;  Surgeon: Bret Ta MD;  Location: [facility] OR     EP ICD      ablation      No Known Allergies   Social History     Tobacco Use     Smoking status: Never     Smokeless tobacco: Never   Substance Use Topics     Alcohol use: Never      Wt Readings from Last 1 Encounters:   22 68.5 kg (151 lb)        Anesthesia Evaluation   Pt has had prior anesthetic. Type: MAC and General.    No history of anesthetic complications       ROS/MED HX  ENT/Pulmonary:  - neg pulmonary ROS     Neurologic:  - neg neurologic ROS     Cardiovascular: Comment: Pt has had an ablation 2019- junctional to atrial guido on EKG    (+) hypertension-----dysrhythmias, a-fib, Previous cardiac testing   Echo: Date:  Results:  EF-55-60%  Left Ventricle--mild to mod LVH     Stress Test: Date: Results:    ECG Reviewed: Date: 2020 Results:  Atrial bradycardia   Cath: Date: Results:      METS/Exercise Tolerance:     Hematologic:     (+) anemia,     Musculoskeletal:  - neg musculoskeletal ROS     GI/Hepatic:  - neg GI/hepatic ROS     Renal/Genitourinary:     (+) renal disease, type: CRI, Pt does not require  dialysis,     Endo:  - neg endo ROS     Psychiatric/Substance Use:  - neg psychiatric ROS     Infectious Disease:  - neg infectious disease ROS     Malignancy:   (+) Malignancy, History of Other.Other CA bladder CA Active status post Chemo and Surgery.    Other:  - neg other ROS          Physical Exam    Airway  airway exam normal      Mallampati: II   TM distance: > 3 FB   Neck ROM: full   Mouth opening: > 3 cm    Respiratory Devices and Support         Dental       (+) caps      Cardiovascular   cardiovascular exam normal       Rhythm and rate: regular and normal     Pulmonary   pulmonary exam normal        breath sounds clear to auscultation           OUTSIDE LABS:  CBC:   Lab Results   Component Value Date    WBC 10.9 07/29/2020    WBC 8.7 07/26/2020    HGB 12.7 (L) 07/28/2022    HGB 10.8 (L) 09/15/2020    HCT 21.6 (L) 07/29/2020    HCT 23.7 (L) 07/26/2020     07/29/2020     07/26/2020     BMP:   Lab Results   Component Value Date     07/28/2022     09/15/2020    POTASSIUM 4.7 07/28/2022    POTASSIUM 4.6 09/15/2020    CHLORIDE 112 (H) 07/28/2022    CHLORIDE 110 (H) 09/15/2020    CO2 28 07/28/2022    CO2 27 09/15/2020    BUN 24 07/28/2022    BUN 26 09/15/2020    CR 1.46 (H) 07/28/2022    CR 1.45 (H) 09/15/2020    GLC 78 07/28/2022    GLC 89 09/15/2020     COAGS:   Lab Results   Component Value Date    PTT 30 07/26/2020    INR 1.53 (H) 07/26/2020     POC: No results found for: BGM, HCG, HCGS  HEPATIC:   Lab Results   Component Value Date    ALBUMIN 3.8 06/17/2020    PROTTOTAL 7.4 06/17/2020    ALT 30 06/17/2020    AST 20 06/17/2020    ALKPHOS 104 06/17/2020    BILITOTAL 0.5 06/17/2020     OTHER:   Lab Results   Component Value Date    DEBBY 9.0 07/28/2022       Anesthesia Plan    ASA Status:  3   NPO Status:  NPO Appropriate    Anesthesia Type: MAC.      Maintenance: TIVA.        Consents    Anesthesia Plan(s) and associated risks, benefits, and realistic alternatives discussed. Questions  answered and patient/representative(s) expressed understanding.    - Discussed:     - Discussed with:  Patient    Use of blood products discussed: No .     Postoperative Care            Comments:    Other Comments: The risks and benefits of anesthesia, and the alternatives where applicable, have been discussed with the patient, and they wish to proceed.            MAETO Alvarado CRNA

## 2022-11-07 NOTE — ANESTHESIA CARE TRANSFER NOTE
Patient: Marilou Saldivar    Procedure: Procedure(s):  COLONOSCOPY, WITH BIOPSY       Diagnosis: Screen for colon cancer [Z12.11]  Diagnosis Additional Information: No value filed.    Anesthesia Type:   MAC     Note:    Oropharynx: oropharynx clear of all foreign objects and spontaneously breathing  Level of Consciousness: drowsy  Oxygen Supplementation: room air    Independent Airway: airway patency satisfactory and stable  Dentition: dentition unchanged  Vital Signs Stable: post-procedure vital signs reviewed and stable  Report to RN Given: handoff report given  Patient transferred to: Phase II    Handoff Report: Identifed the Patient, Identified the Reponsible Provider, Reviewed the pertinent medical history, Discussed the surgical course, Reviewed Intra-OP anesthesia mangement and issues during anesthesia, Set expectations for post-procedure period and Allowed opportunity for questions and acknowledgement of understanding      Vitals:  Vitals Value Taken Time   BP     Temp     Pulse     Resp     SpO2 99 % 11/07/22 1204   Vitals shown include unvalidated device data.    Electronically Signed By: MATEO Alvarado CRNA  November 7, 2022  12:05 PM

## 2022-11-07 NOTE — ANESTHESIA POSTPROCEDURE EVALUATION
Patient: Marilou Saldivar    Procedure: Procedure(s):  COLONOSCOPY, WITH BIOPSY       Anesthesia Type:  MAC    Note:  Disposition: Outpatient   Postop Pain Control: Uneventful            Sign Out: Well controlled pain   PONV: No   Neuro/Psych: Uneventful            Sign Out: Acceptable/Baseline neuro status   Airway/Respiratory: Uneventful            Sign Out: Acceptable/Baseline resp. status   CV/Hemodynamics: Uneventful            Sign Out: Acceptable CV status   Other NRE: NONE   DID A NON-ROUTINE EVENT OCCUR? No    Event details/Postop Comments:  Pt was happy with anesthesia care.  No complications.  I will follow up with the pt if needed.           Last vitals:  Vitals Value Taken Time   /88 11/07/22 1230   Temp     Pulse 58 11/07/22 1230   Resp     SpO2 96 % 11/07/22 1216   Vitals shown include unvalidated device data.    Electronically Signed By: MATEO Alvarado CRNA  November 7, 2022  12:47 PM

## 2022-11-09 LAB
PATH REPORT.COMMENTS IMP SPEC: NORMAL
PATH REPORT.COMMENTS IMP SPEC: NORMAL
PATH REPORT.FINAL DX SPEC: NORMAL
PATH REPORT.GROSS SPEC: NORMAL
PATH REPORT.MICROSCOPIC SPEC OTHER STN: NORMAL
PATH REPORT.RELEVANT HX SPEC: NORMAL
PHOTO IMAGE: NORMAL

## 2022-11-09 PROCEDURE — 88305 TISSUE EXAM BY PATHOLOGIST: CPT | Mod: 26 | Performed by: PATHOLOGY

## 2023-01-20 DIAGNOSIS — I10 PRIMARY HYPERTENSION: ICD-10-CM

## 2023-01-24 RX ORDER — AMLODIPINE BESYLATE 5 MG/1
TABLET ORAL
Qty: 90 TABLET | Refills: 0 | Status: SHIPPED | OUTPATIENT
Start: 2023-01-24 | End: 2023-04-21

## 2023-01-24 NOTE — TELEPHONE ENCOUNTER
Routing refill request to provider for review/approval because:    Requested Prescriptions   Pending Prescriptions Disp Refills    amLODIPine (NORVASC) 5 MG tablet [Pharmacy Med Name: amLODIPine Besylate 5 MG Oral Tablet] 90 tablet 0     Sig: Take 1 tablet by mouth once daily       Calcium Channel Blockers Protocol  Failed - 1/20/2023  6:08 PM        Failed - Normal serum creatinine on file in past 12 months     Recent Labs   Lab Test 07/28/22  1043   CR 1.46*       Ok to refill medication if creatinine is low

## 2023-02-04 DIAGNOSIS — I10 PRIMARY HYPERTENSION: ICD-10-CM

## 2023-02-06 RX ORDER — AMLODIPINE BESYLATE 5 MG/1
TABLET ORAL
Qty: 90 TABLET | Refills: 0 | OUTPATIENT
Start: 2023-02-06

## 2023-02-15 ENCOUNTER — OFFICE VISIT (OUTPATIENT)
Dept: UROLOGY | Facility: CLINIC | Age: 68
End: 2023-02-15
Payer: COMMERCIAL

## 2023-02-15 DIAGNOSIS — Z85.51 PERSONAL HISTORY OF BLADDER CANCER: Primary | ICD-10-CM

## 2023-02-15 PROCEDURE — 52000 CYSTOURETHROSCOPY: CPT | Performed by: UROLOGY

## 2023-02-15 NOTE — PROGRESS NOTES
S: Marilou Saldivar is a 67 year old male returns for bladder cancer surveillance.    he has history of bladder cancer Grade 3 non-invasive, Stage t1, s/p turbt on 7/ 20.     He received 1 courses of BCG therapy and 1 maintenance course    Patient is draped and prepped.  Flexible cystoscopy placed under direct vision.    Cysto:  The anterior urethra is normal   The prostatic urethra is normal.     The length is 1cm,  the coaptation is 1 cm.     In the bladder there is normal mucosa.    Assessment/Plan:  (Z85.51) Personal history of malignant neoplasm of bladder  (primary encounter diagnosis)  Comment: no evidence of cancer recurrence  Plan: CYSTOURETHROSCOPY ([id number])               BTR in 6 months

## 2023-04-21 DIAGNOSIS — I10 PRIMARY HYPERTENSION: ICD-10-CM

## 2023-04-21 RX ORDER — AMLODIPINE BESYLATE 5 MG/1
TABLET ORAL
Qty: 90 TABLET | Refills: 0 | Status: SHIPPED | OUTPATIENT
Start: 2023-04-21 | End: 2023-08-03

## 2023-04-21 NOTE — TELEPHONE ENCOUNTER
Pending Prescriptions:                       Disp   Refills    amLODIPine (NORVASC) 5 MG tablet [Pharmacy*90 tab*0        Sig: Take 1 tablet by mouth once daily      Routing refill request to provider for review/approval because:  Labs out of range:  creatinine    Neena Fontenot RN on 4/21/2023 at 4:58 PM

## 2023-07-30 DIAGNOSIS — I10 PRIMARY HYPERTENSION: ICD-10-CM

## 2023-07-31 RX ORDER — HYDROGEN PEROXIDE 2.65 ML/100ML
LIQUID ORAL; TOPICAL
Qty: 90 TABLET | Refills: 0 | Status: SHIPPED | OUTPATIENT
Start: 2023-07-31 | End: 2023-11-06

## 2023-08-03 RX ORDER — AMLODIPINE BESYLATE 5 MG/1
TABLET ORAL
Qty: 90 TABLET | Refills: 0 | Status: SHIPPED | OUTPATIENT
Start: 2023-08-03 | End: 2023-11-06

## 2023-08-08 ENCOUNTER — MYC MEDICAL ADVICE (OUTPATIENT)
Dept: FAMILY MEDICINE | Facility: CLINIC | Age: 68
End: 2023-08-08
Payer: COMMERCIAL

## 2023-08-08 NOTE — TELEPHONE ENCOUNTER
Patient informed via mychart to schedule. 8/11 reminder if not read to send letter.   Closing encounter.   BRISA Ellis Rolf Gustav, MD   to [facility]      8/3/23  9:51 AM  Due for APE not urgent

## 2023-08-16 ENCOUNTER — OFFICE VISIT (OUTPATIENT)
Dept: UROLOGY | Facility: CLINIC | Age: 68
End: 2023-08-16
Payer: COMMERCIAL

## 2023-08-16 VITALS
SYSTOLIC BLOOD PRESSURE: 134 MMHG | TEMPERATURE: 97.9 F | BODY MASS INDEX: 23.79 KG/M2 | DIASTOLIC BLOOD PRESSURE: 84 MMHG | WEIGHT: 157 LBS | HEIGHT: 68 IN

## 2023-08-16 DIAGNOSIS — Z85.51 PERSONAL HISTORY OF BLADDER CANCER: Primary | ICD-10-CM

## 2023-08-16 PROCEDURE — 52000 CYSTOURETHROSCOPY: CPT | Performed by: UROLOGY

## 2023-08-16 ASSESSMENT — PAIN SCALES - GENERAL: PAINLEVEL: NO PAIN (0)

## 2023-08-16 NOTE — PROGRESS NOTES
S: Marilou Saldivar is a 68 year old male returns for bladder cancer surveillance.    he has history of bladder cancer Grade 3 non-invasive, Stage t1, s/p turbt on 7/ 20.     He received 1 courses of BCG therapy and 1 maintenance course    Patient is draped and prepped.  Flexible cystoscopy placed under direct vision.    Cysto:  The anterior urethra is normal   The prostatic urethra is normal.     The length is 1cm,  the coaptation is 1 cm.     In the bladder there is normal mucosa.    Assessment/Plan:  (Z85.51) Personal history of malignant neoplasm of bladder  (primary encounter diagnosis)  Comment: no evidence of cancer recurrence  Plan: CYSTOURETHROSCOPY ([id number])               BTR in 6 months

## 2023-10-08 ENCOUNTER — HEALTH MAINTENANCE LETTER (OUTPATIENT)
Age: 68
End: 2023-10-08

## 2023-11-01 ENCOUNTER — OFFICE VISIT (OUTPATIENT)
Dept: FAMILY MEDICINE | Facility: CLINIC | Age: 68
End: 2023-11-01
Payer: COMMERCIAL

## 2023-11-01 VITALS
TEMPERATURE: 97.8 F | RESPIRATION RATE: 14 BRPM | HEART RATE: 61 BPM | BODY MASS INDEX: 23.34 KG/M2 | SYSTOLIC BLOOD PRESSURE: 126 MMHG | WEIGHT: 154 LBS | HEIGHT: 68 IN | OXYGEN SATURATION: 100 % | DIASTOLIC BLOOD PRESSURE: 70 MMHG

## 2023-11-01 DIAGNOSIS — Z23 NEED FOR SHINGLES VACCINE: ICD-10-CM

## 2023-11-01 DIAGNOSIS — I48.0 PAF (PAROXYSMAL ATRIAL FIBRILLATION) (H): ICD-10-CM

## 2023-11-01 DIAGNOSIS — I10 PRIMARY HYPERTENSION: ICD-10-CM

## 2023-11-01 DIAGNOSIS — N18.31 STAGE 3A CHRONIC KIDNEY DISEASE (H): Primary | ICD-10-CM

## 2023-11-01 DIAGNOSIS — Z29.11 NEED FOR VACCINATION AGAINST RESPIRATORY SYNCYTIAL VIRUS: ICD-10-CM

## 2023-11-01 DIAGNOSIS — C67.4 MALIGNANT NEOPLASM OF POSTERIOR WALL OF URINARY BLADDER (H): ICD-10-CM

## 2023-11-01 LAB
ANION GAP SERPL CALCULATED.3IONS-SCNC: 10 MMOL/L (ref 7–15)
BUN SERPL-MCNC: 23.5 MG/DL (ref 8–23)
CALCIUM SERPL-MCNC: 9.5 MG/DL (ref 8.8–10.2)
CHLORIDE SERPL-SCNC: 104 MMOL/L (ref 98–107)
CHOLEST SERPL-MCNC: 184 MG/DL
CREAT SERPL-MCNC: 1.72 MG/DL (ref 0.67–1.17)
CREAT UR-MCNC: 181.3 MG/DL
DEPRECATED HCO3 PLAS-SCNC: 27 MMOL/L (ref 22–29)
EGFRCR SERPLBLD CKD-EPI 2021: 42 ML/MIN/1.73M2
GLUCOSE SERPL-MCNC: 93 MG/DL (ref 70–99)
HDLC SERPL-MCNC: 46 MG/DL
HGB BLD-MCNC: 12.3 G/DL (ref 13.3–17.7)
LDLC SERPL CALC-MCNC: 125 MG/DL
MICROALBUMIN UR-MCNC: 15.2 MG/L
MICROALBUMIN/CREAT UR: 8.38 MG/G CR (ref 0–17)
NONHDLC SERPL-MCNC: 138 MG/DL
POTASSIUM SERPL-SCNC: 5 MMOL/L (ref 3.4–5.3)
SODIUM SERPL-SCNC: 141 MMOL/L (ref 135–145)
TRIGL SERPL-MCNC: 65 MG/DL

## 2023-11-01 PROCEDURE — 80048 BASIC METABOLIC PNL TOTAL CA: CPT | Performed by: FAMILY MEDICINE

## 2023-11-01 PROCEDURE — 80061 LIPID PANEL: CPT | Performed by: FAMILY MEDICINE

## 2023-11-01 PROCEDURE — 82043 UR ALBUMIN QUANTITATIVE: CPT | Performed by: FAMILY MEDICINE

## 2023-11-01 PROCEDURE — 85018 HEMOGLOBIN: CPT | Performed by: FAMILY MEDICINE

## 2023-11-01 PROCEDURE — 82570 ASSAY OF URINE CREATININE: CPT | Performed by: FAMILY MEDICINE

## 2023-11-01 PROCEDURE — 36415 COLL VENOUS BLD VENIPUNCTURE: CPT | Performed by: FAMILY MEDICINE

## 2023-11-01 PROCEDURE — 99214 OFFICE O/P EST MOD 30 MIN: CPT | Performed by: FAMILY MEDICINE

## 2023-11-01 RX ORDER — RESPIRATORY SYNCYTIAL VIRUS VACCINE 120MCG/0.5
0.5 KIT INTRAMUSCULAR ONCE
Qty: 1 EACH | Refills: 0 | Status: CANCELLED | OUTPATIENT
Start: 2023-11-01 | End: 2023-11-01

## 2023-11-01 ASSESSMENT — PAIN SCALES - GENERAL: PAINLEVEL: NO PAIN (0)

## 2023-11-01 NOTE — PROGRESS NOTES
Assessment & Plan       ICD-10-CM    1. Stage 3a chronic kidney disease (H)  N18.31 BASIC METABOLIC PANEL     Albumin Random Urine Quantitative with Creat Ratio     Hemoglobin     BASIC METABOLIC PANEL     Albumin Random Urine Quantitative with Creat Ratio     Hemoglobin      2. Need for shingles vaccine  Z23       3. Need for vaccination against respiratory syncytial virus  Z29.11       4. PAF (paroxysmal atrial fibrillation) (H)  I48.0       5. Primary hypertension  I10 Lipid panel reflex to direct LDL Non-fasting     Lipid panel reflex to direct LDL Non-fasting      6. Malignant neoplasm of posterior wall of urinary bladder (H)-s/p cysto 7/23/20  C67.4          Routine follow-up  CKD.  Labs as above.  Avoid nephrotoxic agents.  No microalbuminuria.  Slight decline in GFR from 52-42 this year.  Still stage III.  Nephrology consult if less than 30.    Paroxysmal A-fib.  Reviewed cardiology notes.  Agree with recommendations to continue to monitor.  He is quite symptomatic when it occurs, and he has not had recurrence.  If he remains in sinus, then no indication for anticoagulation.    Hypertension.  At goal.  Continue home monitoring.  No changes today.  Labs as below.    History of bladder cancer.  Continue urology follow-up.  No follow-ups on file.    Kamran Constantino MD  [facility] InstituteKEYSHA Zamarripa is a 68 year old, presenting for the following health issues:  Recheck Medication      11/1/2023     7:28 AM   Additional Questions   Roomed by Stacie ARREDONDO       History of Present Illness       Hypertension: He presents for follow up of hypertension.  He does not check blood pressure  regularly outside of the clinic. Outpatient blood pressures have not been over 140/90. He does not follow a low salt diet.     He eats 2-3 servings of fruits and vegetables daily.He consumes 1 sweetened beverage(s) daily.He exercises with enough effort to increase his heart rate 9 or less minutes per day.   "He exercises with enough effort to increase his heart rate 3 or less days per week.   He is taking medications regularly.       Rare bp at home, monthly, 120-140s  Just saw cards, had ablation  No further afib felt, he felt it strongly  See uro for bladder cancer  Feels like urine is stong smelling        Medication Followup of amlodipine and metoprolol  Taking Medication as prescribed: yes  Side Effects:  None  Medication Helping Symptoms:  yes        Review of Systems         Objective    /70   Pulse 61   Temp 97.8  F (36.6  C) (Tympanic)   Resp 14   Ht 1.727 m (5' 8\")   Wt 69.9 kg (154 lb)   SpO2 100%   BMI 23.42 kg/m    Body mass index is 23.42 kg/m .  Physical Exam   GENERAL: healthy, alert and no distress  NECK: no adenopathy, no asymmetry, masses, or scars and thyroid normal to palpation  RESP: lungs clear to auscultation - no rales, rhonchi or wheezes  CV: regular rate and rhythm, normal S1 S2, no S3 or S4, no murmur, click or rub, no peripheral edema and peripheral pulses strong  ABDOMEN: soft, nontender, no hepatosplenomegaly, no masses and bowel sounds normal  MS: no gross musculoskeletal defects noted, no edema    Recent Results (from the past 24 hour(s))   BASIC METABOLIC PANEL    Collection Time: 11/01/23  8:26 AM   Result Value Ref Range    Sodium 141 135 - 145 mmol/L    Potassium 5.0 3.4 - 5.3 mmol/L    Chloride 104 98 - 107 mmol/L    Carbon Dioxide (CO2) 27 22 - 29 mmol/L    Anion Gap 10 7 - 15 mmol/L    Urea Nitrogen 23.5 (H) 8.0 - 23.0 mg/dL    Creatinine 1.72 (H) 0.67 - 1.17 mg/dL    GFR Estimate 42 (L) >60 mL/min/1.73m2    Calcium 9.5 8.8 - 10.2 mg/dL    Glucose 93 70 - 99 mg/dL   Lipid panel reflex to direct LDL Non-fasting    Collection Time: 11/01/23  8:26 AM   Result Value Ref Range    Cholesterol 184 <200 mg/dL    Triglycerides 65 <150 mg/dL    Direct Measure HDL 46 >=40 mg/dL    LDL Cholesterol Calculated 125 (H) <=100 mg/dL    Non HDL Cholesterol 138 (H) <130 mg/dL "   Hemoglobin    Collection Time: 11/01/23  8:26 AM   Result Value Ref Range    Hemoglobin 12.3 (L) 13.3 - 17.7 g/dL   Albumin Random Urine Quantitative with Creat Ratio    Collection Time: 11/01/23  8:38 AM   Result Value Ref Range    Creatinine Urine mg/dL 181.3 mg/dL    Albumin Urine mg/L 15.2 mg/L    Albumin Urine mg/g Cr 8.38 0.00 - 17.00 mg/g Cr

## 2023-11-04 DIAGNOSIS — I10 PRIMARY HYPERTENSION: ICD-10-CM

## 2023-11-06 RX ORDER — AMLODIPINE BESYLATE 5 MG/1
TABLET ORAL
Qty: 90 TABLET | Refills: 4 | Status: SHIPPED | OUTPATIENT
Start: 2023-11-06

## 2023-11-06 RX ORDER — HYDROGEN PEROXIDE 2.65 ML/100ML
LIQUID ORAL; TOPICAL
Qty: 90 TABLET | Refills: 3 | Status: SHIPPED | OUTPATIENT
Start: 2023-11-06

## 2024-01-13 ENCOUNTER — DOCUMENTATION ONLY (OUTPATIENT)
Dept: LAB | Facility: CLINIC | Age: 69
End: 2024-01-13
Payer: COMMERCIAL

## 2024-01-13 NOTE — PROGRESS NOTES
Patient has a lab appointment 1/15 for a Hepatic panel according to appointment notes, but does not have orders in chart.

## 2024-01-15 ENCOUNTER — LAB (OUTPATIENT)
Dept: LAB | Facility: CLINIC | Age: 69
End: 2024-01-15
Payer: COMMERCIAL

## 2024-01-15 DIAGNOSIS — N18.30 CKD (CHRONIC KIDNEY DISEASE) STAGE 3, GFR 30-59 ML/MIN (H): Primary | ICD-10-CM

## 2024-01-15 LAB
ANION GAP SERPL CALCULATED.3IONS-SCNC: 14 MMOL/L (ref 7–15)
BUN SERPL-MCNC: 29.2 MG/DL (ref 8–23)
CALCIUM SERPL-MCNC: 9.6 MG/DL (ref 8.8–10.2)
CHLORIDE SERPL-SCNC: 105 MMOL/L (ref 98–107)
CREAT SERPL-MCNC: 1.6 MG/DL (ref 0.67–1.17)
DEPRECATED HCO3 PLAS-SCNC: 23 MMOL/L (ref 22–29)
EGFRCR SERPLBLD CKD-EPI 2021: 46 ML/MIN/1.73M2
GLUCOSE SERPL-MCNC: 88 MG/DL (ref 70–99)
POTASSIUM SERPL-SCNC: 4.2 MMOL/L (ref 3.4–5.3)
SODIUM SERPL-SCNC: 142 MMOL/L (ref 135–145)

## 2024-01-15 PROCEDURE — 80048 BASIC METABOLIC PNL TOTAL CA: CPT

## 2024-01-15 PROCEDURE — 36415 COLL VENOUS BLD VENIPUNCTURE: CPT

## 2024-02-21 ENCOUNTER — OFFICE VISIT (OUTPATIENT)
Dept: UROLOGY | Facility: CLINIC | Age: 69
End: 2024-02-21
Payer: COMMERCIAL

## 2024-02-21 DIAGNOSIS — Z85.51 PERSONAL HISTORY OF BLADDER CANCER: Primary | ICD-10-CM

## 2024-02-21 PROCEDURE — 52000 CYSTOURETHROSCOPY: CPT | Performed by: UROLOGY

## 2024-02-21 NOTE — PROGRESS NOTES
S: Marilou Saldivar is a 69 year old male returns for bladder cancer surveillance.    he has history of bladder cancer Grade 3 non-invasive, Stage t1, s/p turbt on 7/ 20.     He received 1 courses of BCG therapy and 1 maintenance course    Patient is draped and prepped.  Flexible cystoscopy placed under direct vision.    Cysto:  The anterior urethra is normal   The prostatic urethra is normal.     The length is 1cm,  the coaptation is 1 cm.     In the bladder there is normal mucosa.    Assessment/Plan:  (Z85.51) Personal history of malignant neoplasm of bladder  (primary encounter diagnosis)  Comment: no evidence of cancer recurrence  Plan: CYSTOURETHROSCOPY ([id number])               BTR in 6 months

## 2024-06-03 ENCOUNTER — TELEPHONE (OUTPATIENT)
Dept: FAMILY MEDICINE | Facility: CLINIC | Age: 69
End: 2024-06-03
Payer: COMMERCIAL

## 2024-06-03 ENCOUNTER — NURSE TRIAGE (OUTPATIENT)
Dept: FAMILY MEDICINE | Facility: CLINIC | Age: 69
End: 2024-06-03

## 2024-06-03 NOTE — TELEPHONE ENCOUNTER
RN TRIAGE CALL:    Patient Contact    Attempt # 1    Was call answered?  No.  Left message on voicemail with information to call me back.    Glendy Bell, RN, BSN

## 2024-06-03 NOTE — TELEPHONE ENCOUNTER
Reason for Call:  Appointment Request    Patient requesting this type of appt:  any provider at the [place] location    Requested provider:  any    Reason patient unable to be scheduled: Not within requested timeframe    When does patient want to be seen/preferred time: Same day    Comments: Patient fell on Tuesday and still is having lower back pain and would like to know if someone can see him today?    Could we send this information to you in [facility] or would you prefer to receive a phone call?:   Patient would prefer a phone call   Okay to leave a detailed message?: Yes at Cell number on file:    Telephone Information:   Mobile [phone]     Criss Webster   [facility]  Central Scheduler  Call taken on 6/3/2024 at 8:24 AM by CRISS WEBSTER

## 2024-06-03 NOTE — TELEPHONE ENCOUNTER
Pt calling stating he has on and off back pain after his fall last Tuesday     Pt states his pain seem to be worsening rather then getting better    Pt is asking for an xray to confirm nothing is broken so he can go to the chiropractor and know they are damaging his back further    Pt states he has had times where is back is 10/10 pain but he is stating it is usually more moderate pain.     Pt states his back pain starts around the rib area and radiates down to his tailbone or lower back area- states it does NOT radiate into the legs at all     Denies numbness and weakness    Is still able to urinate and have Bms     Per protocol- be seen today or tomorrow    Please review/advise    Almita Horne RN      Reason for Disposition   MODERATE pain (e.g., interferes with normal activities) and high-risk adult (e.g., age > 60 years, osteoporosis, chronic steroid use)    Additional Information   Negative: Dangerous mechanism of injury (e.g., MVA, contact sports, trampoline, diving, fall > 10 feet or 3 meters)  (Exception: Back pain began > 1 hour after injury.)   Negative: Weakness (i.e., paralysis, loss of muscle strength) of the leg(s) or foot and sudden onset after back injury   Negative: Numbness (i.e., loss of sensation) of the leg(s) or foot and sudden onset after back injury   Negative: Major bleeding (actively dripping or spurting) that can't be stopped   Negative: Bullet, knife or other serious penetrating wound   Negative: Shock suspected (e.g., cold/pale/clammy skin, too weak to stand, low BP, rapid pulse)   Negative: Sounds like a life-threatening emergency to the triager   Negative: Back pain from overuse (work, exercise, gardening) OR from twisting, lifting, or bending injury   Negative: Back pain not from an injury   Negative: SEVERE pain in kidney area (flank) that follows a direct blow to that site   Negative: Blood in urine (red, pink, or tea-colored)   Negative: Unable to urinate (or only a few drops)  > 4 hours and bladder feels very full (e.g., palpable bladder or strong urge to urinate)   Negative: Loss of bladder or bowel control (urine or bowel incontinence; wetting self, leaking stool) of new-onset   Negative: Numbness (loss of sensation) in groin or rectal area   Negative: Skin is split open or gaping (length > 1/2 inch or 12 mm)   Negative: Puncture wound of back   Negative: Bleeding won't stop after 10 minutes of direct pressure (using correct technique)   Negative: Sounds like a serious injury to the triager   Negative: Weakness of a leg or foot (e.g., unable to bear weight, dragging foot)   Negative: Numbness of a leg or foot (i.e., loss of sensation)   Negative: SEVERE back pain (e.g., excruciating, unable to do any normal activities) and not improved after pain medicine and CARE ADVICE   Negative: Pain radiates into the thigh or further down the leg now   Negative: Landed hard on feet or buttocks and pain over spine   Negative: Large swelling or bruise and size > palm of person's hand   Negative: No prior tetanus shots (or is not fully vaccinated) and any wound (e.g., cut or scrape)   Negative: HIV positive or severe immunodeficiency (severely weak immune system) and DIRTY cut or scrape   Negative: Patient is confused or is an unreliable provider of information (e.g., dementia, severe intellectual disability, alcohol intoxication)   Negative: Patient wants to be seen    Protocols used: Back Injury-A-[place]

## 2024-06-05 ENCOUNTER — OFFICE VISIT (OUTPATIENT)
Dept: FAMILY MEDICINE | Facility: CLINIC | Age: 69
End: 2024-06-05
Payer: COMMERCIAL

## 2024-06-05 VITALS
DIASTOLIC BLOOD PRESSURE: 80 MMHG | TEMPERATURE: 97.7 F | SYSTOLIC BLOOD PRESSURE: 108 MMHG | OXYGEN SATURATION: 99 % | HEIGHT: 68 IN | HEART RATE: 60 BPM | BODY MASS INDEX: 23.79 KG/M2 | RESPIRATION RATE: 18 BRPM | WEIGHT: 157 LBS

## 2024-06-05 DIAGNOSIS — I48.0 PAF (PAROXYSMAL ATRIAL FIBRILLATION) (H): ICD-10-CM

## 2024-06-05 DIAGNOSIS — M54.9 MECHANICAL BACK PAIN: Primary | ICD-10-CM

## 2024-06-05 DIAGNOSIS — C67.4 MALIGNANT NEOPLASM OF POSTERIOR WALL OF URINARY BLADDER (H): ICD-10-CM

## 2024-06-05 DIAGNOSIS — N18.31 STAGE 3A CHRONIC KIDNEY DISEASE (H): ICD-10-CM

## 2024-06-05 PROCEDURE — 99213 OFFICE O/P EST LOW 20 MIN: CPT | Performed by: FAMILY MEDICINE

## 2024-06-05 ASSESSMENT — PAIN SCALES - GENERAL: PAINLEVEL: WORST PAIN (10)

## 2024-06-05 ASSESSMENT — ENCOUNTER SYMPTOMS: BACK PAIN: 1

## 2024-06-05 NOTE — PROGRESS NOTES
Assessment & Plan       ICD-10-CM    1. Mechanical back pain  M54.9       2. Malignant neoplasm of posterior wall of urinary bladder (H)  C67.4       3. PAF (paroxysmal atrial fibrillation) (H)  I48.0       4. Stage 3a chronic kidney disease (H)  N18.31            Comes to clinic with left low back pain, spasm on exam, after a fall from standing height.  I doubt more considerable injury.  He has nice relief with ibuprofen and so I gave him a schedule to start taking them more regularly.  He will also see the chiropractor to see if they can work free that spasm.  If he does not improve over the next couple of weeks I asked him to reach back out for reexamination and he agrees no red flags today.    No follow-ups on file.    Kamarn Constantino MD  [facility]     Manda Zamarripa is a 69 year old, presenting for the following health issues:  Back Pain and Fall        6/5/2024     9:28 AM   Additional Questions   Roomed by Jazmín EDWARDS     History of Present Illness       Back Pain:  He presents for follow up of back pain. Patient's back pain is a new problem.    Original cause of back pain: a fall  First noticed back pain: in the last week  Patient feels back pain: constantlyLocation of back pain:  Left lower back and left hip  Description of back pain: stabbing  Back pain spreads: nowhere and right buttocks    Since patient first noticed back pain, pain is: unchanged  Does back pain interfere with his job:  Yes  On a scale of 1-10 (10 being the worst), patient describes pain as:  10  What makes back pain worse: bending, certain positions, sitting and standing   Acupuncture: not tried  Acetaminophen: helpful  Activity or exercise: not helpful  Chiropractor:  Not tried  Cold: helpful  Heat: helpful  Massage: not tried  Muscle relaxants: not tried  NSAIDS: helpful  Opioids: not tried  Physical Therapy: not tried  Rest: helpful  Steroid Injection: not tried  Stretching: not helpful  Surgery: not  "tried  TENS unit: not tried  Topical pain relievers: helpful  Other healthcare providers patient is seeing for back pain: None    He eats 2-3 servings of fruits and vegetables daily.He exercises with enough effort to increase his heart rate 9 or less minutes per day.  He exercises with enough effort to increase his heart rate 3 or less days per week.   He is taking medications regularly.       No better in last week  Driving worse  No real comfortable position  Tried aleve/ibu, went from 10/10 to 5  No fever          Review of Systems  Constitutional, HEENT, cardiovascular, pulmonary, gi and gu systems are negative, except as otherwise noted.      Objective    /80   Pulse 60   Temp 97.7  F (36.5  C) (Temporal)   Resp 18   Ht 1.727 m (5' 8\")   Wt 71.2 kg (157 lb)   SpO2 99%   BMI 23.87 kg/m    Body mass index is 23.87 kg/m .  Physical Exam   Well-appearing and in no distress. Heart is regular. Lungs are clear and unlabored. spine is in good aligment grossly. No spasms appreciated. There is mild tenderness diffusely in the left lumbar area.  SI negative. Negative Edil test. Negative straight leg raise. Normal symmetric patellar reflexes. No ankle clonus. Normal strength and sensation to light touch testing in lower extremities. gait in normal appearance, toe walk normal            Signed Electronically by: Kamran Constantino MD    "

## 2024-08-28 ENCOUNTER — OFFICE VISIT (OUTPATIENT)
Dept: UROLOGY | Facility: CLINIC | Age: 69
End: 2024-08-28
Payer: COMMERCIAL

## 2024-08-28 DIAGNOSIS — Z85.51 PERSONAL HISTORY OF BLADDER CANCER: Primary | ICD-10-CM

## 2024-08-28 PROCEDURE — 52000 CYSTOURETHROSCOPY: CPT | Performed by: UROLOGY

## 2024-08-28 NOTE — PROGRESS NOTES
S: Marilou Saldivar is a 69 year old male returns for bladder cancer surveillance.    he has history of bladder cancer Grade 3 non-invasive, Stage t1, s/p turbt on 7/ 20.     He received 1 courses of BCG therapy and 1 maintenance course    Patient is draped and prepped.  Flexible cystoscopy placed under direct vision.    Cysto:  The anterior urethra is normal   The prostatic urethra is normal.     The length is 1cm,  the coaptation is 1 cm.     In the bladder there is normal mucosa.    Assessment/Plan:  (Z85.51) Personal history of malignant neoplasm of bladder  (primary encounter diagnosis)  Comment: no evidence of cancer recurrence  Plan: CYSTOURETHROSCOPY (81871)               BTR in 12 months

## 2024-10-29 DIAGNOSIS — I10 PRIMARY HYPERTENSION: ICD-10-CM

## 2024-10-29 RX ORDER — HYDROGEN PEROXIDE 2.65 ML/100ML
LIQUID ORAL; TOPICAL
Qty: 90 TABLET | Refills: 1 | Status: SHIPPED | OUTPATIENT
Start: 2024-10-29

## 2024-12-01 ENCOUNTER — HEALTH MAINTENANCE LETTER (OUTPATIENT)
Age: 69
End: 2024-12-01

## 2024-12-10 ENCOUNTER — TELEPHONE (OUTPATIENT)
Dept: FAMILY MEDICINE | Facility: CLINIC | Age: 69
End: 2024-12-10
Payer: COMMERCIAL

## 2024-12-10 NOTE — TELEPHONE ENCOUNTER
Patient Quality Outreach    Patient is due for the following:   Physical Annual Wellness Visit      Topic Date Due    Pneumococcal Vaccine (1 of 2 - PCV) Never done    Zoster (Shingles) Vaccine (1 of 2) Never done    Flu Vaccine (1) Never done    COVID-19 Vaccine (1 - 2024-25 season) Never done       Action(s) Taken:   Schedule a Annual Wellness Visit    Type of outreach:    Sent letter.    Questions for provider review:    None           Stacie Reyes MA

## 2024-12-10 NOTE — LETTER
December 10, 2024      Marilou Saldivar  [address]  [place] [address]      Your team at [facility] cares about your health. We have reviewed your chart and based on our findings; we are making the following recommendations to better manage your health.     You are in particular need of attention regarding the following:     PREVENTATIVE VISIT: Annual Medicare Wellness:Schedule an Annual Medicare Wellness Exam. Please call your [facility] clinic to set up your appointment.    If you have already completed these items, please contact the clinic via phone or   MyChart so your care team can review and update your records. Thank you for   choosing [facility] for your healthcare needs. For any questions,   concerns, or to schedule an appointment please contact our clinic.    Healthy Regards,      Your [facility] Care Team

## 2025-01-25 DIAGNOSIS — I10 PRIMARY HYPERTENSION: ICD-10-CM

## 2025-01-28 RX ORDER — AMLODIPINE BESYLATE 5 MG/1
TABLET ORAL
Qty: 90 TABLET | Refills: 0 | Status: SHIPPED | OUTPATIENT
Start: 2025-01-28

## 2025-01-29 NOTE — TELEPHONE ENCOUNTER
Kamran Constantino MD to [facility] Pool Regarding result: amLODIPine (NORVASC) 5 MG tablet   RH      1/28/25  5:10 PM  Filled. Needs appt for med check, not urgent

## 2025-04-24 DIAGNOSIS — I10 PRIMARY HYPERTENSION: ICD-10-CM

## 2025-04-24 RX ORDER — HYDROGEN PEROXIDE 2.65 ML/100ML
LIQUID ORAL; TOPICAL
Qty: 90 TABLET | Refills: 0 | Status: SHIPPED | OUTPATIENT
Start: 2025-04-24

## 2025-04-29 RX ORDER — AMLODIPINE BESYLATE 5 MG/1
TABLET ORAL
Qty: 90 TABLET | Refills: 0 | Status: SHIPPED | OUTPATIENT
Start: 2025-04-29

## 2025-05-30 PROBLEM — I48.0 PAF (PAROXYSMAL ATRIAL FIBRILLATION) (H): Status: RESOLVED | Noted: 2019-05-16 | Resolved: 2025-05-30

## 2025-06-03 ENCOUNTER — RESULTS FOLLOW-UP (OUTPATIENT)
Dept: FAMILY MEDICINE | Facility: CLINIC | Age: 70
End: 2025-06-03

## 2025-06-03 DIAGNOSIS — D64.9 ANEMIA, UNSPECIFIED TYPE: Primary | ICD-10-CM

## 2025-06-18 ENCOUNTER — RESULTS FOLLOW-UP (OUTPATIENT)
Dept: FAMILY MEDICINE | Facility: CLINIC | Age: 70
End: 2025-06-18

## 2025-08-27 ENCOUNTER — OFFICE VISIT (OUTPATIENT)
Dept: UROLOGY | Facility: CLINIC | Age: 70
End: 2025-08-27
Payer: COMMERCIAL

## 2025-08-27 DIAGNOSIS — Z85.51 PERSONAL HISTORY OF BLADDER CANCER: Primary | ICD-10-CM

## (undated) DEVICE — CABLE HIGH FREQEUCY STERILE 8MM PLUG 300CM 277KB-D/10

## (undated) DEVICE — BAG URINARY DRAIN 4000ML LF 153509

## (undated) DEVICE — LUBRICATING JELLY 4.25OZ

## (undated) DEVICE — DRAPE GYN/UROLOGY FLUID POUCH TUR 29455

## (undated) DEVICE — PACK BASIC SET-UP 9101

## (undated) DEVICE — CATH PLUG

## (undated) DEVICE — TUBING SUCTION 6"X3/16" N56A

## (undated) DEVICE — GLOVE PROTEXIS W/NEU-THERA 7.5  2D73TE75

## (undated) DEVICE — PREP SKIN SCRUB TRAY 4461A

## (undated) DEVICE — SYR 30ML LL W/O NDL

## (undated) DEVICE — SOL WATER IRRIG 1000ML BOTTLE 2F7114

## (undated) DEVICE — ESU ELEC LOOP 24FR 20750G

## (undated) DEVICE — PACK SET-UP STD 9102

## (undated) DEVICE — TUBING SUCTION 12"X1/4" N612

## (undated) DEVICE — GLOVE PROTEXIS W/NEU-THERA 6.5  2D73TE65

## (undated) DEVICE — PREP POVIDONE IODINE SCRUB 7.5% 120ML

## (undated) DEVICE — GLOVE BIOGEL PI SZ 7.5 40875

## (undated) DEVICE — SYR ELLIK EVACUATOR W/ADAPT LATEX

## (undated) DEVICE — CATH TRAY FOLEY 16FR SILICONE 907416

## (undated) DEVICE — Device

## (undated) DEVICE — BASIN SET MINOR DISP

## (undated) DEVICE — TUBING IRR TUR Y TYPE 2C4041

## (undated) DEVICE — PEN MARKING SKIN

## (undated) DEVICE — TUBING CYSTO/BLADDER IRRIG SET 80" 06544-01

## (undated) DEVICE — LABEL MEDICATION SYSTEM  3304

## (undated) DEVICE — SOL WATER INJ 2000ML BAG 07118-07

## (undated) DEVICE — DRSG TELFA 2X3"

## (undated) DEVICE — SYR 50ML CATH TIP W/O NDL 309620

## (undated) DEVICE — GOWN XLG DISP 9545

## (undated) DEVICE — SOL WATER IRRIG 1000ML BOTTLE 07139-09

## (undated) DEVICE — KIT ENDO TURNOVER/PROCEDURE CARRY-ON 101822

## (undated) RX ORDER — NEOSTIGMINE METHYLSULFATE 1 MG/ML
INJECTION, SOLUTION INTRAVENOUS
Status: DISPENSED
Start: 2020-07-23

## (undated) RX ORDER — DEXAMETHASONE SODIUM PHOSPHATE 10 MG/ML
INJECTION, SOLUTION INTRAMUSCULAR; INTRAVENOUS
Status: DISPENSED
Start: 2020-07-23

## (undated) RX ORDER — PROPOFOL 10 MG/ML
INJECTION, EMULSION INTRAVENOUS
Status: DISPENSED
Start: 2020-07-27

## (undated) RX ORDER — PROPOFOL 10 MG/ML
INJECTION, EMULSION INTRAVENOUS
Status: DISPENSED
Start: 2020-07-23

## (undated) RX ORDER — LIDOCAINE HYDROCHLORIDE 20 MG/ML
INJECTION, SOLUTION EPIDURAL; INFILTRATION; INTRACAUDAL; PERINEURAL
Status: DISPENSED
Start: 2020-07-23

## (undated) RX ORDER — LIDOCAINE HYDROCHLORIDE 20 MG/ML
JELLY TOPICAL
Status: DISPENSED
Start: 2020-07-27

## (undated) RX ORDER — ONDANSETRON 2 MG/ML
INJECTION INTRAMUSCULAR; INTRAVENOUS
Status: DISPENSED
Start: 2020-07-23

## (undated) RX ORDER — FENTANYL CITRATE 50 UG/ML
INJECTION, SOLUTION INTRAMUSCULAR; INTRAVENOUS
Status: DISPENSED
Start: 2020-07-27

## (undated) RX ORDER — FENTANYL CITRATE 50 UG/ML
INJECTION, SOLUTION INTRAMUSCULAR; INTRAVENOUS
Status: DISPENSED
Start: 2020-07-23